# Patient Record
Sex: FEMALE | Race: OTHER | Employment: PART TIME | URBAN - METROPOLITAN AREA
[De-identification: names, ages, dates, MRNs, and addresses within clinical notes are randomized per-mention and may not be internally consistent; named-entity substitution may affect disease eponyms.]

---

## 2017-09-10 ENCOUNTER — HOSPITAL ENCOUNTER (EMERGENCY)
Age: 22
Discharge: HOME | End: 2017-09-10
Attending: EMERGENCY MEDICINE
Payer: SUBSIDIZED

## 2017-09-10 VITALS
DIASTOLIC BLOOD PRESSURE: 76 MMHG | BODY MASS INDEX: 20.16 KG/M2 | TEMPERATURE: 98.5 F | OXYGEN SATURATION: 97 % | HEIGHT: 59 IN | WEIGHT: 100 LBS | HEART RATE: 89 BPM | RESPIRATION RATE: 16 BRPM | SYSTOLIC BLOOD PRESSURE: 122 MMHG

## 2017-09-10 DIAGNOSIS — J06.9 ACUTE UPPER RESPIRATORY INFECTION: Primary | ICD-10-CM

## 2017-09-10 LAB — DEPRECATED S PYO AG THROAT QL EIA: NEGATIVE

## 2017-09-10 PROCEDURE — 99283 EMERGENCY DEPT VISIT LOW MDM: CPT

## 2017-09-10 PROCEDURE — 74011250637 HC RX REV CODE- 250/637: Performed by: EMERGENCY MEDICINE

## 2017-09-10 PROCEDURE — 87070 CULTURE OTHR SPECIMN AEROBIC: CPT | Performed by: EMERGENCY MEDICINE

## 2017-09-10 PROCEDURE — 87880 STREP A ASSAY W/OPTIC: CPT | Performed by: EMERGENCY MEDICINE

## 2017-09-10 RX ORDER — DIPHENHYDRAMINE HCL 12.5MG/5ML
25 ELIXIR ORAL
Status: COMPLETED | OUTPATIENT
Start: 2017-09-10 | End: 2017-09-10

## 2017-09-10 RX ORDER — FLUTICASONE PROPIONATE 50 MCG
2 SPRAY, SUSPENSION (ML) NASAL DAILY
Qty: 1 BOTTLE | Refills: 0 | Status: SHIPPED | OUTPATIENT
Start: 2017-09-10 | End: 2018-03-17

## 2017-09-10 RX ADMIN — DIPHENHYDRAMINE HYDROCHLORIDE 25 MG: 12.5 SOLUTION ORAL at 12:53

## 2017-09-10 NOTE — DISCHARGE INSTRUCTIONS

## 2017-09-12 LAB
BACTERIA SPEC CULT: NORMAL
SERVICE CMNT-IMP: NORMAL

## 2017-12-08 ENCOUNTER — HOSPITAL ENCOUNTER (EMERGENCY)
Age: 22
Discharge: HOME | End: 2017-12-08
Attending: EMERGENCY MEDICINE
Payer: SUBSIDIZED

## 2017-12-08 VITALS
HEART RATE: 76 BPM | OXYGEN SATURATION: 100 % | DIASTOLIC BLOOD PRESSURE: 84 MMHG | BODY MASS INDEX: 20.16 KG/M2 | WEIGHT: 100 LBS | SYSTOLIC BLOOD PRESSURE: 125 MMHG | HEIGHT: 59 IN | RESPIRATION RATE: 18 BRPM | TEMPERATURE: 98.2 F

## 2017-12-08 DIAGNOSIS — E05.00 GRAVES DISEASE: Primary | ICD-10-CM

## 2017-12-08 LAB
AMORPH CRY URNS QL MICRO: ABNORMAL
APPEARANCE UR: ABNORMAL
ATRIAL RATE: 70 BPM
BACTERIA URNS QL MICRO: NEGATIVE /HPF
BILIRUB UR QL: NEGATIVE
CALCULATED P AXIS, ECG09: 45 DEGREES
CALCULATED R AXIS, ECG10: 3 DEGREES
CALCULATED T AXIS, ECG11: 11 DEGREES
COLOR UR: ABNORMAL
DIAGNOSIS, 93000: NORMAL
EPITH CASTS URNS QL MICRO: ABNORMAL /LPF
GLUCOSE UR STRIP.AUTO-MCNC: NEGATIVE MG/DL
HGB UR QL STRIP: NEGATIVE
KETONES UR QL STRIP.AUTO: NEGATIVE MG/DL
LEUKOCYTE ESTERASE UR QL STRIP.AUTO: ABNORMAL
NITRITE UR QL STRIP.AUTO: NEGATIVE
P-R INTERVAL, ECG05: 126 MS
PH UR STRIP: 7.5 [PH] (ref 5–8)
PROT UR STRIP-MCNC: NEGATIVE MG/DL
Q-T INTERVAL, ECG07: 374 MS
QRS DURATION, ECG06: 88 MS
QTC CALCULATION (BEZET), ECG08: 403 MS
RBC #/AREA URNS HPF: ABNORMAL /HPF (ref 0–5)
SP GR UR REFRACTOMETRY: 1.02 (ref 1–1.03)
T4 FREE SERPL-MCNC: 1.6 NG/DL (ref 0.8–1.5)
TSH SERPL DL<=0.05 MIU/L-ACNC: 0.01 UIU/ML (ref 0.36–3.74)
UROBILINOGEN UR QL STRIP.AUTO: 0.2 EU/DL (ref 0.2–1)
VENTRICULAR RATE, ECG03: 70 BPM
WBC URNS QL MICRO: ABNORMAL /HPF (ref 0–4)

## 2017-12-08 PROCEDURE — 84443 ASSAY THYROID STIM HORMONE: CPT | Performed by: FAMILY MEDICINE

## 2017-12-08 PROCEDURE — 84439 ASSAY OF FREE THYROXINE: CPT | Performed by: FAMILY MEDICINE

## 2017-12-08 PROCEDURE — 81001 URINALYSIS AUTO W/SCOPE: CPT | Performed by: FAMILY MEDICINE

## 2017-12-08 PROCEDURE — 99283 EMERGENCY DEPT VISIT LOW MDM: CPT

## 2017-12-08 PROCEDURE — 93005 ELECTROCARDIOGRAM TRACING: CPT

## 2017-12-08 PROCEDURE — 36415 COLL VENOUS BLD VENIPUNCTURE: CPT | Performed by: FAMILY MEDICINE

## 2017-12-08 RX ORDER — PROPRANOLOL HYDROCHLORIDE 10 MG/1
10 TABLET ORAL DAILY
Qty: 10 TAB | Refills: 0 | Status: SHIPPED | OUTPATIENT
Start: 2017-12-08 | End: 2017-12-11 | Stop reason: SDUPTHER

## 2017-12-08 RX ORDER — METHIMAZOLE 10 MG/1
10 TABLET ORAL DAILY
Qty: 30 TAB | Refills: 0 | Status: SHIPPED | OUTPATIENT
Start: 2017-12-08 | End: 2017-12-11 | Stop reason: SDUPTHER

## 2017-12-08 NOTE — ED NOTES
Patient given discharge instruction by Alan Watkins. Verbalized understanding, pt discharge home with family.

## 2017-12-08 NOTE — ED PROVIDER NOTES
HPI Comments: Ms. Mariola Joaquin is a 23year old female with history of Grave's disease who presents with headache. For the past 4 days she has had intermittent bifrontal, pressure like headache associated with photophobia and phonophobia. Headache is not currently present. The headache lasts a couple of hours when it comes and then goes away on its own. Of note, patient has not been taking her methimazole or propranolol because she ran out of her medications and has not established with a PCP. Yesterday she felt like she had some palpitations. She denies chest pain. She also complains of some mood swings and feelings of anxiety. The history is provided by the patient. No past medical history on file. No past surgical history on file. No family history on file. Social History     Social History    Marital status:      Spouse name: N/A    Number of children: N/A    Years of education: N/A     Occupational History    Not on file. Social History Main Topics    Smoking status: Not on file    Smokeless tobacco: Not on file    Alcohol use Not on file    Drug use: Not on file    Sexual activity: Not on file     Other Topics Concern    Not on file     Social History Narrative         ALLERGIES: Review of patient's allergies indicates no known allergies. Review of Systems   Constitutional: Negative for activity change, chills, fatigue and fever. HENT: Negative. Negative for congestion, dental problem, ear pain, mouth sores, sinus pain, sinus pressure and sore throat. Eyes: Negative for photophobia, pain and visual disturbance. Respiratory: Negative for cough, chest tightness and shortness of breath. Cardiovascular: Negative for chest pain and palpitations. Gastrointestinal: Negative for abdominal pain, constipation, diarrhea, nausea and vomiting. Genitourinary: Negative. Negative for difficulty urinating and urgency.    Musculoskeletal: Negative for back pain and neck pain.   Skin: Negative. Neurological: Positive for headaches. Negative for speech difficulty. Psychiatric/Behavioral: Negative. Vitals:    12/08/17 1220   BP: 125/84   Pulse: 76   Resp: 18   Temp: 98.2 °F (36.8 °C)   SpO2: 100%   Weight: 45.4 kg (100 lb)   Height: 4' 11\" (1.499 m)            Physical Exam   Constitutional: She is oriented to person, place, and time. She appears well-developed and well-nourished. No distress. HENT:   Head: Normocephalic and atraumatic. Mouth/Throat: Oropharynx is clear and moist.   Eyes: Conjunctivae are normal.   Neck: Neck supple. Thyromegaly present. Cardiovascular: Normal rate, regular rhythm and normal heart sounds. No murmur heard. Pulmonary/Chest: Effort normal and breath sounds normal. No respiratory distress. She has no wheezes. Abdominal: Soft. Bowel sounds are normal. She exhibits no distension. There is no tenderness. Musculoskeletal: She exhibits no deformity. Neurological: She is alert and oriented to person, place, and time. Skin: Skin is warm and dry. Psychiatric: She has a normal mood and affect. Nursing note and vitals reviewed.        MDM  Number of Diagnoses or Management Options  Diagnosis management comments: 23year old female with uncontrolled Graves disease  - Check TSH and T4  - Discharge home with propranolol    ED Course       Procedures

## 2017-12-08 NOTE — ED TRIAGE NOTES
Pt c/o headache that began yesterday, and also has felt \"irregular heartbeat\". + photophobia Pt denies nausea or vomiting. Reports history of Graves disease.

## 2017-12-08 NOTE — LETTER
[facility] EMERGENCY DEPT 
[address] [address] [id number] [phone] Work/School Note Date: 12/8/2017 To Whom It May concern: 
 
Rebecca Douglas was seen and treated today in the emergency room by the following provider(s): 
Attending Provider: Robi Roper MD 
Resident: Ander Delgado MD. Rebecca Douglas may return to work on 12/12/17. Sincerely, Winnie Romero RN

## 2017-12-11 ENCOUNTER — OFFICE VISIT (OUTPATIENT)
Dept: FAMILY MEDICINE CLINIC | Age: 22
End: 2017-12-11

## 2017-12-11 VITALS
HEIGHT: 59 IN | WEIGHT: 99 LBS | RESPIRATION RATE: 16 BRPM | HEART RATE: 62 BPM | BODY MASS INDEX: 19.96 KG/M2 | TEMPERATURE: 99 F | SYSTOLIC BLOOD PRESSURE: 104 MMHG | DIASTOLIC BLOOD PRESSURE: 68 MMHG

## 2017-12-11 DIAGNOSIS — Z28.21 REFUSED INFLUENZA VACCINE: ICD-10-CM

## 2017-12-11 DIAGNOSIS — N92.6 IRREGULAR PERIODS/MENSTRUAL CYCLES: ICD-10-CM

## 2017-12-11 DIAGNOSIS — Z76.89 ENCOUNTER TO ESTABLISH CARE: ICD-10-CM

## 2017-12-11 DIAGNOSIS — L70.0 ACNE VULGARIS: ICD-10-CM

## 2017-12-11 DIAGNOSIS — E05.00 GRAVES DISEASE: Primary | ICD-10-CM

## 2017-12-11 RX ORDER — NORGESTIMATE AND ETHINYL ESTRADIOL 0.25-0.035
1 KIT ORAL DAILY
Qty: 1 PACKAGE | Refills: 11 | Status: SHIPPED | OUTPATIENT
Start: 2017-12-11 | End: 2018-10-04 | Stop reason: SDUPTHER

## 2017-12-11 RX ORDER — PROPRANOLOL HYDROCHLORIDE 10 MG/1
10 TABLET ORAL DAILY
Qty: 90 TAB | Refills: 2 | Status: SHIPPED | OUTPATIENT
Start: 2017-12-11 | End: 2018-03-17

## 2017-12-11 RX ORDER — METHIMAZOLE 10 MG/1
10 TABLET ORAL DAILY
Qty: 90 TAB | Refills: 2 | Status: SHIPPED | OUTPATIENT
Start: 2017-12-11

## 2017-12-11 NOTE — PROGRESS NOTES
Subjective:   Karis Sanchez is a 23 y.o. female with history of Grave's Disease  CC: Follow up Graves disease  History provided by patient. HPI:  Graves disease: Main issues currently include: Fatigue, migraines, mood swings. Fatigue that occurs about half the days of the week, described as a \"crash\" during the day. Denies syncopal episodes, but has had \"dizzy\" episodes. Migraines starting at top of head to behind eyes last minutes to hours. Exacerbated by light and sound. Mood swings improving with medications. Currently taking Methimazole and propranolol daily. Restarted Methimazole and Propranolol on Friday. Patient Last saw endocrinologist [facility]). Before moving was taking Methimazole every other day. Acne: Worse with stress and not controlled Graves Disease. Improving overall. Irregular Periods:  Patient with periods that are irregular in cycle, not every 30 days. Length of period irregular, on average 4-6 days. Noting significant cramping and bleeding on first day. First period at 16-14 y.o. Social History:  Exercise: No  Diet: ~4-5 servings vegetables/fruits, mixed diet    Current Outpatient Prescriptions on File Prior to Visit   Medication Sig Dispense Refill    fluticasone (FLONASE) 50 mcg/actuation nasal spray 2 Sprays by Both Nostrils route daily. 1 Bottle 0     No current facility-administered medications on file prior to visit.         Patient Active Problem List   Diagnosis Code    Graves disease E05.00     Past Surgical History:   Procedure Laterality Date    HX WISDOM TEETH EXTRACTION       Family History   Problem Relation Age of Onset    No Known Problems Mother     No Known Problems Brother     Diabetes Maternal Grandmother     Hypertension Maternal Grandmother     Cancer Maternal Grandfather     No Known Problems Brother     No Known Problems Brother        Social History     Social History    Marital status: UNKNOWN     Spouse name: N/A    Number of children: N/A    Years of education: N/A     Occupational History    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 Social History Main Topics    Smoking status: Never Smoker    Smokeless tobacco: Never Used    Alcohol use No    Drug use: No    Sexual activity: Not Currently     Partners: Male      Comment: Previously Sexually active,      Other Topics Concern    Not on file     Social History Narrative    No narrative on file       Review of Systems   Constitutional: Positive for malaise/fatigue. Negative for chills and fever. Respiratory: Negative for shortness of breath. Cardiovascular: Negative for chest pain and palpitations. Gastrointestinal: Negative for abdominal pain, nausea and vomiting. Musculoskeletal: Negative for myalgias. Neurological: Positive for headaches. Objective:     Visit Vitals    /68 (BP 1 Location: Right arm, BP Patient Position: Sitting)    Pulse 62    Temp 99 °F (37.2 °C) (Oral)    Resp 16    Ht 4' 11\" (1.499 m)    Wt 99 lb (44.9 kg)    LMP 11/24/2017 (Approximate)    BMI 20 kg/m2        Physical Exam   Constitutional: She is oriented to person, place, and time. She appears well-developed and well-nourished. No distress. HENT:   Head: Normocephalic and atraumatic. Eyes: EOM are normal. Pupils are equal, round, and reactive to light. Neck: Thyromegaly present. No thyroid mass present. Cardiovascular: Normal rate, regular rhythm, normal heart sounds and intact distal pulses. Exam reveals no gallop and no friction rub. No murmur heard. Pulmonary/Chest: Effort normal and breath sounds normal.   Abdominal: Soft. Bowel sounds are normal. She exhibits no distension. There is no tenderness. Musculoskeletal: Normal range of motion. She exhibits no edema. Neurological: She is alert and oriented to person, place, and time. Skin:   Acne, mild non-inflammatory   Psychiatric: She has a normal mood and affect.  Her behavior is normal. Judgment and thought content normal.   Nursing note and vitals reviewed. Pertinent Labs/Studies:      Assessment and orders:       ICD-10-CM ICD-9-CM    1. Graves disease E05.00 242.00 REFERRAL TO ENDOCRINOLOGY      propranolol (INDERAL) 10 mg tablet      methIMAzole (TAPAZOLE) 10 mg tablet   2. Encounter to establish care Z76.89 V65.8    3. Acne vulgaris L70.0 706.1 benzoyl peroxide 10 % topical cream   4. Irregular periods/menstrual cycles N92.6 626.4 norgestimate-ethinyl estradiol (ORTHO-CYCLEN, SPRINTEC) 0.25-35 mg-mcg tab   5. Refused influenza vaccine Z28.21 V64.06      Diagnoses and all orders for this visit:    1. Graves disease/Encounter to establish care: Follow up from ED, improving. Lapse secondary to moving and loss of insurance. Refilling medications and referring to Endocrinology. Repeat TSH/T4 in 6 weeks. -     REFERRAL TO ENDOCRINOLOGY  -     propranolol (INDERAL) 10 mg tablet; Take 1 Tab by mouth daily. -     methIMAzole (TAPAZOLE) 10 mg tablet; Take 1 Tab by mouth daily. 2. Acne vulgaris: Mild case, starting basic topical agent. As patient is interested in becoming sexual active with avoid retinoids. -     benzoyl peroxide 10 % topical cream; Apply  to affected area nightly. 3. Irregular periods/menstrual cycles: Starting Sprintec. Advised on proper use of medication and risks with missing medications. -     norgestimate-ethinyl estradiol (Duy Kanaris) 0.25-35 mg-mcg tab; Take 1 Tab by mouth daily. Follow-up Disposition:  Return in about 6 weeks (around 1/22/2018). I have reviewed patient medical and social history and medications. I have reviewed pertinent labs results and other data. I have discussed the diagnosis with the patient and the intended plan as seen in the above orders. The patient has received an after-visit summary and questions were answered concerning future plans.  I have discussed medication side effects and warnings with the patient as pat.    Jose Manuel Gutierrez MD  Resident [facility]  12/11/17

## 2017-12-11 NOTE — MR AVS SNAPSHOT
Visit Information Date & Time Provider Department Dept. Phone Encounter #  
 12/11/2017  2:05 PM Anibal Howell, [facility] [phone] [id number] Upcoming Health Maintenance Date Due Hepatitis A Peds Age 1-18 (1 of 2 - Standard Series) 5/3/1998 DTaP/Tdap/Td series (1 - Tdap) 5/3/2004 HPV AGE 9Y-26Y (1 of 3 - Female 3 Dose Series) 5/3/2008 Influenza Age 5 to Adult 8/1/2017 Allergies as of 12/11/2017  Review Complete On: 12/8/2017 By: Dalila Capellan RN No Known Allergies Current Immunizations  Never Reviewed No immunizations on file. Not reviewed this visit You Were Diagnosed With   
  
 Codes Comments Graves disease    -  Primary ICD-10-CM: E05.00 ICD-9-CM: 242.00 Encounter to establish care     ICD-10-CM: Z76.89 
ICD-9-CM: V65.8 Acne vulgaris     ICD-10-CM: L70.0 ICD-9-CM: 706.1 Irregular periods/menstrual cycles     ICD-10-CM: N92.6 ICD-9-CM: 626.4 Vitals BP Pulse Temp Resp Height(growth percentile) Weight(growth percentile) 104/68 (BP 1 Location: Right arm, BP Patient Position: Sitting) 62 99 °F (37.2 °C) (Oral) 16 4' 11\" (1.499 m) 99 lb (44.9 kg) LMP BMI OB Status Smoking Status 11/24/2017 (Approximate) 20 kg/m2 Having regular periods Never Smoker BMI and BSA Data Body Mass Index Body Surface Area  
 20 kg/m 2 1.37 m 2 Preferred Pharmacy Pharmacy Name Phone [id number] [address], [address] [phone] Your Updated Medication List  
  
   
This list is accurate as of: 12/11/17  3:35 PM.  Always use your most recent med list.  
  
  
  
  
 benzoyl peroxide 10 % topical cream  
Apply  to affected area nightly. fluticasone 50 mcg/actuation nasal spray Commonly known as:  Ronak Peel 2 Sprays by Both Nostrils route daily. methIMAzole 10 mg tablet Commonly known as:  TAPAZOLE Take 1 Tab by mouth daily. norgestimate-ethinyl estradiol 0.25-35 mg-mcg Tab Commonly known as:  Scott Mary Jane Take 1 Tab by mouth daily. propranolol 10 mg tablet Commonly known as:  INDERAL Take 1 Tab by mouth daily. Prescriptions Printed Refills  
 propranolol (INDERAL) 10 mg tablet 2 Sig: Take 1 Tab by mouth daily. Class: Print Route: Oral  
 methIMAzole (TAPAZOLE) 10 mg tablet 2 Sig: Take 1 Tab by mouth daily. Class: Print Route: Oral  
 norgestimate-ethinyl estradiol (ORTHO-CYCLEN, SPRINTEC) 0.25-35 mg-mcg tab 11 Sig: Take 1 Tab by mouth daily. Class: Print Route: Oral  
 benzoyl peroxide 10 % topical cream 1 Sig: Apply  to affected area nightly. Class: Print Route: Topical  
  
We Performed the Following REFERRAL TO ENDOCRINOLOGY [PXX27 Custom] Referral Information Referral ID Referred By Referred To  
  
 [id number] Aiden Shanna, [address] [place] [address] [place], [facility] Phone: [phone] Fax: [phone] Visits Status Start Date End Date 1 New Request 12/11/17 12/11/18 If your referral has a status of pending review or denied, additional information will be sent to support the outcome of this decision. Introducing [facility]! Jazmin Khan introduces [employer] patient portal. Now you can access parts of your medical record, email your doctor's office, and request medication refills online. 1. In your internet browser, go to [email or web address] 2. Click on the First Time User? Click Here link in the Sign In box. You will see the New Member Sign Up page. 3. Enter your [employer] Access Code exactly as it appears below. You will not need to use this code after youve completed the sign-up process. If you do not sign up before the expiration date, you must request a new code.  
 
· [employer] Access Code: [email or web address] 
 Expires: 3/11/2018  3:35 PM 
 
4. Enter the last four digits of your Social Security Number (xxxx) and Date of Birth (mm/dd/yyyy) as indicated and click Submit. You will be taken to the next sign-up page. 5. Create a [employer] ID. This will be your [employer] login ID and cannot be changed, so think of one that is secure and easy to remember. 6. Create a [employer] password. You can change your password at any time. 7. Enter your Password Reset Question and Answer. This can be used at a later time if you forget your password. 8. Enter your e-mail address. You will receive e-mail notification when new information is available in [address] 9. Click Sign Up. You can now view and download portions of your medical record. 10. Click the Download Summary menu link to download a portable copy of your medical information. If you have questions, please visit the Frequently Asked Questions section of the [employer] website. Remember, [employer] is NOT to be used for urgent needs. For medical emergencies, dial 911. Now available from your iPhone and Android! Please provide this summary of care documentation to your next provider. Your primary care clinician is listed as NONE. If you have any questions after today's visit, please call [phone].

## 2017-12-19 ENCOUNTER — OFFICE VISIT (OUTPATIENT)
Dept: ENDOCRINOLOGY | Age: 22
End: 2017-12-19

## 2017-12-19 VITALS
TEMPERATURE: 98.1 F | HEIGHT: 59 IN | SYSTOLIC BLOOD PRESSURE: 103 MMHG | WEIGHT: 100.3 LBS | HEART RATE: 77 BPM | OXYGEN SATURATION: 100 % | RESPIRATION RATE: 14 BRPM | DIASTOLIC BLOOD PRESSURE: 74 MMHG | BODY MASS INDEX: 20.22 KG/M2

## 2017-12-19 DIAGNOSIS — E05.00 GRAVES DISEASE: Primary | ICD-10-CM

## 2017-12-19 DIAGNOSIS — E04.0 GOITER DIFFUSE: ICD-10-CM

## 2017-12-19 NOTE — PROGRESS NOTES
[facility]               Steve Malik MD              [address] 6[id number]           Patient Information  Date:12/21/2017  Name : Isaac Valencia 23 y.o.     YOB: 1995         Referred by: Jose Manuel Gutierrez MD         Chief Complaint   Patient presents with    New Patient     referred for Thyroid       History of present illness    Isaac Valencia is a 23 y.o. female  here for evaluation of hyperthyroidism. She was diagnosed with Graves' disease while she was in [place] in 2016, was on methimazole, stopped beginning of 2017 as she had no insurance. She  was evaluated by PCP, started on methimazole. No weight loss but complains of tiredness, intermittent nervousness. No change in the size of the neck. + heat intolerance. Diarrhea +     No A fib or osteoporosis  No recent illness . No iodine exposure     No history of known radiation exposure    No FH of thyroid disease. No FH of thyroid cancer     Wt Readings from Last 3 Encounters:   12/19/17 100 lb 4.8 oz (45.5 kg)   12/11/17 99 lb (44.9 kg)   12/08/17 100 lb (45.4 kg)       Past Medical History:   Diagnosis Date    Graves disease     Thyroid disease        Current Outpatient Prescriptions   Medication Sig    propranolol (INDERAL) 10 mg tablet Take 1 Tab by mouth daily.  methIMAzole (TAPAZOLE) 10 mg tablet Take 1 Tab by mouth daily.  fluticasone (FLONASE) 50 mcg/actuation nasal spray 2 Sprays by Both Nostrils route daily. (Patient taking differently: 2 Sprays by Both Nostrils route as needed.)    norgestimate-ethinyl estradiol (ORTHO-CYCLEN, SPRINTEC) 0.25-35 mg-mcg tab Take 1 Tab by mouth daily.  benzoyl peroxide 10 % topical cream Apply  to affected area nightly. No current facility-administered medications for this visit.         No Known Allergies    Review of Systems:  - Constitutional Symptoms: no fevers, chills, weight loss  - Eyes: no blurry vision or double vision  - Cardiovascular: no chest pain +palpitations  - Respiratory: no cough or shortness of breath  - Gastrointestinal: no dysphagia or abdominal pain  - Musculoskeletal: no joint pains or weakness  - Integumentary: no rashes  - Neurological: no numbness, tingling, or headaches  - Psychiatric: no depression or anxiety  - Endocrine: no heat or cold intolerance, no polyuria or polydipsia    Physical Examination:  Blood pressure 103/74, pulse 77, temperature 98.1 °F (36.7 °C), temperature source Oral, resp. rate 14, height 4' 11\" (1.499 m), weight 100 lb 4.8 oz (45.5 kg), last menstrual period 11/24/2017, SpO2 100 %. Body mass index is 20.26 kg/(m^2). - General: pleasant, no distress, good eye contact  - HEENT: no exopthalmos, no periorbital edema, no scleral/conjunctival injection, EOMI, no lid lag or stare, right eye prominent  - Neck: supple,+ thyromegaly, nodules, lymph nodes,   - Cardiovascular: regular,  normal S1 and S2, no murmurs  - Respiratory: clear to auscultation bilaterally  - Gastrointestinal: soft, nontender, nondistended, BS +  - Musculoskeletal: no proximal muscle weakness in upper or lower extremities  - Integumentary: no tremors, no edema  - Neurological: alert and oriented   - Psychiatric: normal mood and affect  - Skin - normal turgor    Data Reviewed:       Lab Results   Component Value Date/Time    TSH 0.01 12/08/2017 01:08 PM    T4, Free 1.6 12/08/2017 01:08 PM        [] Reviewed labs    Assessment/Plan:     1. Graves disease    2. Goiter diffuse        Discussed the treatment options for hyperthyroidism: anti-thyroid drugs, radioiodine ablation and surgery. Surgery is usually for large goiters, who cannot take radioactive iodine or anti-thyroid medications. I explained that she may need to be on a thyroid supplement for the rest of her life once  thyroid becomes underactive, which in most patients, occurs within 2-6 months after radioiodine. Reviewed potential side effects of methimazole including rash,neutropenia ( low blood count)and rarely liver inflammation. Propranolol half tablet twice daily, discussed about the weaning protocol. Methimazole , she will decide about radioactive iodine therapy and let me know. Advised to avoid pregnancy, and if she gets pregnant was asked to notify the office. Follow up labs       Follow-up Disposition:  Return in about 2 months (around 2/19/2018). Thank you for allowing me to participate in the care of this patient.     Jaydon Ashraf MD      Patient verbalized understanding

## 2017-12-19 NOTE — PATIENT INSTRUCTIONS
Potential side effects of methimazole are rash,low blood count and rarely liver inflammation. If you get high grade fever,bad sorethroat,or sores in the mouth ,please call the office for blood tests. If white blood count is low,the methimazole should be stopped and the counts will normalize in 7 to 10 days. Propranolol 1/2 tab twice a day       Radioactive Iodine: What to Expect at Home  Your Recovery  Radioactive iodine is absorbed and concentrated by the thyroid gland. You get it in liquid or pill form. The radiation will pass out of your body through your urine within days. Until that time, you will give off radiation in your sweat, your saliva, your urine, and anything else that comes out of your body. It is important to avoid exposing other people to the radioactivity from your body. Your doctor will give you more written instructions. Follow these carefully. The instructions will tell you how far to stay away from people and how long you need to follow the precautions listed below. They will list other ways to keep other people safe. They will also tell you when it will be safe to go out, go to work, and do other activities. How can you care for yourself at home? General recommendations  · For a period of time, you will need to keep your distance from other people, especially young children and pregnant women. · Avoid close contact, kissing, and sexual activity. You may need to sleep in a separate bed from your partner. · Keep the toilet very clean. Men should urinate sitting down to avoid splashing. Flush the toilet 2 or 3 times after each use. Wash your hands well with soap and lots of water each time you use the toilet. · Rinse the bathroom sink and tub well after you use them. · Use separate towels, washcloths, and sheets. Wash these and your personal clothing by themselves. Don't wash them with other people's laundry.   · You may want to use a special plastic trash bag for all your trash, such as bandages, paper or plastic dishes, menstrual pads, tissues, or paper towels. Talk to your treatment facility to see if they will handle the disposal. Or after 80 days, this bag can be thrown out with your other trash. · Wash your dishes in a  or by hand. If you use disposable dishes, they must be thrown away in the special plastic trash bag. · Don't cook for other people. If you must cook, use plastic gloves. Then throw them away in the special plastic trash bag. Don't share cups, dishes, or utensils. Pregnancy and children  · Your doctor will tell you when it is safe to have sex and become pregnant. · You should not breastfeed your baby after you have been treated with radioactive iodine. Ask your doctor when it's safe to breastfeed. Travel  · Don't take public transportation. If you are able, it's best to drive yourself. · It is important to prepare for any problems you may have at airport security. People who have had radioactive iodine treatment can set off the radiation detection machines in airports for a week to 10 days. Check with local authorities about any steps or permission you may need to travel. · If you plan to travel on the interstate, you may set off radiation detectors. Most police and transportation workers are aware of medical radiation, but it may help to carry some paperwork from your doctor. Follow-up care is a key part of your treatment and safety. Be sure to make and go to all appointments, and call your doctor if you are having problems. It's also a good idea to know your test results and keep a list of the medicines you take. When should you call for help? Watch closely for any changes in your health, and be sure to contact your doctor if:  ? · You have a sore throat. ? · You vomit. ? · You have diarrhea. Where can you learn more? Go to http://valerie-charissa.info/.   Enter N018 in the search box to learn more about \"Radioactive Iodine: What to Expect at Home. \"  Current as of: May 12, 2017  Content Version: 11.4  © 0388-4014 "[employer]. Care instructions adapted under license by [employer] (which disclaims liability or warranty for this information). If you have questions about a medical condition or this instruction, always ask your healthcare professional. [address] any warranty or liability for your use of this information. Hyperthyroidism: Care Instructions  Your Care Instructions  Hyperthyroidism occurs when the thyroid gland makes too much thyroid hormone. This speeds up your metabolism-how your body uses energy. This condition can cause you to be very active, lose weight, and have sleep problems, eye problems, and a fast heart rate. It can also cause a goiter. A goiter is an enlarged thyroid gland that you can see at the front of the neck. Hyperthyroidism is often caused by Graves' disease. In Graves' disease, the body's defense (immune) system attacks the thyroid gland. Your doctor may prescribe a beta-blocker medicine to slow your pulse and calm you down. But this is not a treatment for hyperthyroidism. It is given for your fast heart rate. Your doctor may also give you antithyroid medicine. This medicine keeps excess thyroid hormone in check. In some cases, doctors recommend radioactive iodine or surgery to remove the thyroid. After either of these treatments, you may need to take medicine to replace thyroid hormone for the rest of your life. Follow-up care is a key part of your treatment and safety. Be sure to make and go to all appointments, and call your doctor if you are having problems. It's also a good idea to know your test results and keep a list of the medicines you take. How can you care for yourself at home? · Take your medicines exactly as prescribed. You need to take the thyroid medicine at the same time each day.  Call your doctor if you think you are having a problem with your medicine. · Graves' disease can make your eyes sore. Use artificial tears, eye drops, and sunglasses to protect your eyes from dryness, wind, and sun. Raise your head with pillows at night to prevent your eyes from swelling. In some cases, taping your eyelids shut at night will keep your eyes from being dry in the morning. · Make sure you get enough calcium. Foods that are rich in calcium include milk, yogurt, cheese, and dark green vegetables. · If you need to gain weight, ask your doctor about special diets. · Do not eat kelp. Chris Carson is high in iodine, which can make hyperthyroidism worse. Chris Carson is commonly used in [employer] and other [place] foods. You can use iodized salt and eat bread and seafood. Try to eat a balanced diet. · Do not use caffeine and other stimulants. These can make symptoms worse, such as a fast heartbeat, nervousness, and problems focusing. · Do not smoke. Smoking can make your condition worse and may lead to more serious eye problems. If you need help quitting, talk to your doctor about stop-smoking programs and medicines. These can increase your chances of quitting for good. · Lower your stress. Learn to use biofeedback, guided imagery, meditation, or other methods to relax. · Use creams or ointments for irritated skin. Ask your doctor which type to use. · Tell all your doctors about your condition. They need to know because some medicines contain iodine. When should you call for help? Call your doctor now or seek immediate medical care if:  ? · You have symptoms of a sudden, very high thyroid level (thyroid storm). These include:  ¨ Being nauseated, vomiting, and having diarrhea. ¨ Sweating a lot. ¨ Feeling extremely restless and confused. ¨ Having a high fever. ¨ Having a fast heartbeat. ? · You have sudden vision changes or eye pain. ? · You have a fever or severe sore throat and are taking antithyroid medicines, such as PTU or methimazole. ? Watch closely for changes in your health, and be sure to contact your doctor if:  ? · You have a sore throat or have problems swallowing. ? · You have swollen, itchy, or red eyes or your other eye symptoms get worse, or you have new vision problems. ? · You have signs of a low thyroid level (hypothyroidism). You may feel very tired, confused, or weak. Where can you learn more? Go to http://valerie-charissa.info/. Enter B031 in the search box to learn more about \"Hyperthyroidism: Care Instructions. \"  Current as of: May 12, 2017  Content Version: 11.4  © 6557-8029 [employer]. Care instructions adapted under license by [employer] by [employer] (which disclaims liability or warranty for this information). If you have questions about a medical condition or this instruction, always ask your healthcare professional. [address] any warranty or liability for your use of this information.

## 2017-12-19 NOTE — PROGRESS NOTES
Bobby Kovacs is a 23 y.o. female here for   Chief Complaint   Patient presents with    New Patient     referred for Thyroid       1. Have you been to the ER, urgent care clinic since your last visit? Hospitalized since your last visit? -n/a    2. Have you seen or consulted any other health care providers outside of the [address] [place] [address] since your last visit?   Include any pap smears or colon screening.-n/a    Wt Readings from Last 3 Encounters:   12/11/17 99 lb (44.9 kg)   12/08/17 100 lb (45.4 kg)   09/10/17 100 lb (45.4 kg)     Temp Readings from Last 3 Encounters:   12/11/17 99 °F (37.2 °C) (Oral)   12/08/17 98.2 °F (36.8 °C)   09/10/17 98.5 °F (36.9 °C)     BP Readings from Last 3 Encounters:   12/11/17 104/68   12/08/17 125/84   09/10/17 122/76     Pulse Readings from Last 3 Encounters:   12/11/17 62   12/08/17 76   09/10/17 89     Order placed for pt per verbal order with read back from Dr. Patricia Miranda 12/19/17

## 2017-12-19 NOTE — MR AVS SNAPSHOT
Visit Information Date & Time Provider Department Dept. Phone Encounter #  
 12/19/2017  4:00 PM Sugar Alba MD [facility] Diabetes [facility] Endocrinology [phone] [id number] Follow-up Instructions Return in about 2 months (around 2/19/2018). Upcoming Health Maintenance Date Due Hepatitis A Peds Age 1-18 (1 of 2 - Standard Series) 5/3/1998 DTaP/Tdap/Td series (1 - Tdap) 5/3/2004 HPV AGE 9Y-26Y (1 of 3 - Female 3 Dose Series) 5/3/2008 Influenza Age 5 to Adult 8/1/2017 Allergies as of 12/19/2017  Review Complete On: 12/19/2017 By: Sugar Alba MD  
 No Known Allergies Current Immunizations  Never Reviewed No immunizations on file. Not reviewed this visit You Were Diagnosed With   
  
 Codes Comments Graves disease    -  Primary ICD-10-CM: E05.00 ICD-9-CM: 242.00 Goiter diffuse     ICD-10-CM: E04.9 ICD-9-CM: 240.9 Vitals BP Pulse Temp Resp Height(growth percentile) Weight(growth percentile) 103/74 (BP 1 Location: Left arm, BP Patient Position: Sitting) 77 98.1 °F (36.7 °C) (Oral) 14 4' 11\" (1.499 m) 100 lb 4.8 oz (45.5 kg) LMP SpO2 BMI OB Status Smoking Status 11/24/2017 (Approximate) 100% 20.26 kg/m2 Having regular periods Never Smoker Vitals History BMI and BSA Data Body Mass Index Body Surface Area  
 20.26 kg/m 2 1.38 m 2 Preferred Pharmacy Pharmacy Name Phone [phone] [phone] [facility], [address] [phone] Your Updated Medication List  
  
   
This list is accurate as of: 12/19/17  5:09 PM.  Always use your most recent med list.  
  
  
  
  
 benzoyl peroxide 10 % topical cream  
Apply  to affected area nightly. fluticasone 50 mcg/actuation nasal spray Commonly known as:  Katia Serene 2 Sprays by Both Nostrils route daily. methIMAzole 10 mg tablet Commonly known as:  TAPAZOLE Take 1 Tab by mouth daily. norgestimate-ethinyl estradiol 0.25-35 mg-mcg Tab Commonly known as:  Clare Newkirk Take 1 Tab by mouth daily. propranolol 10 mg tablet Commonly known as:  INDERAL Take 1 Tab by mouth daily. Follow-up Instructions Return in about 2 months (around 2/19/2018). Patient Instructions Potential side effects of methimazole are rash,low blood count and rarely liver inflammation. If you get high grade fever,bad sorethroat,or sores in the mouth ,please call the office for blood tests. If white blood count is low,the methimazole should be stopped and the counts will normalize in 7 to 10 days. Propranolol 1/2 tab twice a day Radioactive Iodine: What to Expect at [facility] Your Recovery Radioactive iodine is absorbed and concentrated by the thyroid gland. You get it in liquid or pill form. The radiation will pass out of your body through your urine within days. Until that time, you will give off radiation in your sweat, your saliva, your urine, and anything else that comes out of your body. It is important to avoid exposing other people to the radioactivity from your body. Your doctor will give you more written instructions. Follow these carefully. The instructions will tell you how far to stay away from people and how long you need to follow the precautions listed below. They will list other ways to keep other people safe. They will also tell you when it will be safe to go out, go to work, and do other activities. How can you care for yourself at home? General recommendations · For a period of time, you will need to keep your distance from other people, especially young children and pregnant women. · Avoid close contact, kissing, and sexual activity. You may need to sleep in a separate bed from your partner. · Keep the toilet very clean. Men should urinate sitting down to avoid splashing. Flush the toilet 2 or 3 times after each use.  Wash your hands well with soap and lots of water each time you use the toilet. · Rinse the bathroom sink and tub well after you use them. · Use separate towels, washcloths, and sheets. Wash these and your personal clothing by themselves. Don't wash them with other people's laundry. · You may want to use a special plastic trash bag for all your trash, such as bandages, paper or plastic dishes, menstrual pads, tissues, or paper towels. Talk to your treatment facility to see if they will handle the disposal. Or after 80 days, this bag can be thrown out with your other trash. · Wash your dishes in a  or by hand. If you use disposable dishes, they must be thrown away in the special plastic trash bag. · Don't cook for other people. If you must cook, use plastic gloves. Then throw them away in the special plastic trash bag. Don't share cups, dishes, or utensils. Pregnancy and children · Your doctor will tell you when it is safe to have sex and become pregnant. · You should not breastfeed your baby after you have been treated with radioactive iodine. Ask your doctor when it's safe to breastfeed. Travel · Don't take public transportation. If you are able, it's best to drive yourself. · It is important to prepare for any problems you may have at airport security. People who have had radioactive iodine treatment can set off the radiation detection machines in airports for a week to 10 days. Check with local authorities about any steps or permission you may need to travel. · If you plan to travel on the interstate, you may set off radiation detectors. Most police and transportation workers are aware of medical radiation, but it may help to carry some paperwork from your doctor. Follow-up care is a key part of your treatment and safety. Be sure to make and go to all appointments, and call your doctor if you are having problems. It's also a good idea to know your test results and keep a list of the medicines you take. When should you call for help? Watch closely for any changes in your health, and be sure to contact your doctor if: 
? · You have a sore throat. ? · You vomit. ? · You have diarrhea. Where can you learn more? Go to http://valerie-charissa.info/. Enter H643 in the search box to learn more about \"Radioactive Iodine: What to Expect at Home. \" Current as of: May 12, 2017 Content Version: 11.4 © 0173-5390 [employer]. Care instructions adapted under license by [employer] (which disclaims liability or warranty for this information). If you have questions about a medical condition or this instruction, always ask your healthcare professional. [place] [address] any warranty or liability for your use of this information. Hyperthyroidism: Care Instructions Your Care Instructions Hyperthyroidism occurs when the thyroid gland makes too much thyroid hormone. This speeds up your metabolism-how your body uses energy. This condition can cause you to be very active, lose weight, and have sleep problems, eye problems, and a fast heart rate. It can also cause a goiter. A goiter is an enlarged thyroid gland that you can see at the front of the neck. Hyperthyroidism is often caused by Graves' disease. In Graves' disease, the body's defense (immune) system attacks the thyroid gland. Your doctor may prescribe a beta-blocker medicine to slow your pulse and calm you down. But this is not a treatment for hyperthyroidism. It is given for your fast heart rate. Your doctor may also give you antithyroid medicine. This medicine keeps excess thyroid hormone in check. In some cases, doctors recommend radioactive iodine or surgery to remove the thyroid. After either of these treatments, you may need to take medicine to replace thyroid hormone for the rest of your life. Follow-up care is a key part of your treatment and safety.  Be sure to make and go to all appointments, and call your doctor if you are having problems. It's also a good idea to know your test results and keep a list of the medicines you take. How can you care for yourself at home? · Take your medicines exactly as prescribed. You need to take the thyroid medicine at the same time each day. Call your doctor if you think you are having a problem with your medicine. · Graves' disease can make your eyes sore. Use artificial tears, eye drops, and sunglasses to protect your eyes from dryness, wind, and sun. Raise your head with pillows at night to prevent your eyes from swelling. In some cases, taping your eyelids shut at night will keep your eyes from being dry in the morning. · Make sure you get enough calcium. Foods that are rich in calcium include milk, yogurt, cheese, and dark green vegetables. · If you need to gain weight, ask your doctor about special diets. · Do not eat kelp. Kristan Flair is high in iodine, which can make hyperthyroidism worse. Kristan Flair is commonly used in [employer] and other needmade foods. You can use iodized salt and eat bread and seafood. Try to eat a balanced diet. · Do not use caffeine and other stimulants. These can make symptoms worse, such as a fast heartbeat, nervousness, and problems focusing. · Do not smoke. Smoking can make your condition worse and may lead to more serious eye problems. If you need help quitting, talk to your doctor about stop-smoking programs and medicines. These can increase your chances of quitting for good. · Lower your stress. Learn to use biofeedback, guided imagery, meditation, or other methods to relax. · Use creams or ointments for irritated skin. Ask your doctor which type to use. · Tell all your doctors about your condition. They need to know because some medicines contain iodine. When should you call for help?  
Call your doctor now or seek immediate medical care if: 
? · You have symptoms of a sudden, very high thyroid level (thyroid storm). These include: ¨ Being nauseated, vomiting, and having diarrhea. ¨ Sweating a lot. ¨ Feeling extremely restless and confused. ¨ Having a high fever. ¨ Having a fast heartbeat. ? · You have sudden vision changes or eye pain. ? · You have a fever or severe sore throat and are taking antithyroid medicines, such as PTU or methimazole. ? Watch closely for changes in your health, and be sure to contact your doctor if: 
? · You have a sore throat or have problems swallowing. ? · You have swollen, itchy, or red eyes or your other eye symptoms get worse, or you have new vision problems. ? · You have signs of a low thyroid level (hypothyroidism). You may feel very tired, confused, or weak. Where can you learn more? Go to http://valerie-charissa.info/. Enter G140 in the search box to learn more about \"Hyperthyroidism: Care Instructions. \" Current as of: May 12, 2017 Content Version: 11.4 © 0018-0614 [employer]. Care instructions adapted under license by [employer] (which disclaims liability or warranty for this information). If you have questions about a medical condition or this instruction, always ask your healthcare professional. [place] [address] any warranty or liability for your use of this information. Introducing [facility]! Chin Gonzalez introduces Intervention Insights patient portal. Now you can access parts of your medical record, email your doctor's office, and request medication refills online. 1. In your internet browser, go to [email or web address] 2. Click on the First Time User? Click Here link in the Sign In box. You will see the New Member Sign Up page. 3. Enter your Intervention Insights Access Code exactly as it appears below. You will not need to use this code after youve completed the sign-up process. If you do not sign up before the expiration date, you must request a new code. · ReCellular Access Code: [email or web address] Expires: 3/11/2018  3:35 PM 
 
4. Enter the last four digits of your Social Security Number (xxxx) and Date of Birth (mm/dd/yyyy) as indicated and click Submit. You will be taken to the next sign-up page. 5. Create a ReCellular ID. This will be your ReCellular login ID and cannot be changed, so think of one that is secure and easy to remember. 6. Create a ReCellular password. You can change your password at any time. 7. Enter your Password Reset Question and Answer. This can be used at a later time if you forget your password. 8. Enter your e-mail address. You will receive e-mail notification when new information is available in [address] 9. Click Sign Up. You can now view and download portions of your medical record. 10. Click the Download Summary menu link to download a portable copy of your medical information. If you have questions, please visit the Frequently Asked Questions section of the ReCellular website. Remember, ReCellular is NOT to be used for urgent needs. For medical emergencies, dial 911. Now available from your iPhone and Android! Please provide this summary of care documentation to your next provider. Your primary care clinician is listed as Concetta Dhillon. If you have any questions after today's visit, please call [phone].

## 2018-01-06 ENCOUNTER — HOSPITAL ENCOUNTER (EMERGENCY)
Age: 23
Discharge: HOME | End: 2018-01-06
Attending: EMERGENCY MEDICINE
Payer: SUBSIDIZED

## 2018-01-06 VITALS
DIASTOLIC BLOOD PRESSURE: 58 MMHG | OXYGEN SATURATION: 99 % | SYSTOLIC BLOOD PRESSURE: 100 MMHG | RESPIRATION RATE: 18 BRPM | WEIGHT: 100 LBS | BODY MASS INDEX: 20.16 KG/M2 | HEART RATE: 86 BPM | TEMPERATURE: 98.4 F | HEIGHT: 59 IN

## 2018-01-06 DIAGNOSIS — J02.0 STREP THROAT: Primary | ICD-10-CM

## 2018-01-06 LAB — DEPRECATED S PYO AG THROAT QL EIA: POSITIVE

## 2018-01-06 PROCEDURE — 99282 EMERGENCY DEPT VISIT SF MDM: CPT

## 2018-01-06 PROCEDURE — 87880 STREP A ASSAY W/OPTIC: CPT | Performed by: EMERGENCY MEDICINE

## 2018-01-06 RX ORDER — PENICILLIN V POTASSIUM 500 MG/1
500 TABLET, FILM COATED ORAL 3 TIMES DAILY
Qty: 30 TAB | Refills: 0 | Status: SHIPPED | OUTPATIENT
Start: 2018-01-06 | End: 2018-01-16

## 2018-01-06 RX ORDER — DEXAMETHASONE SODIUM PHOSPHATE 4 MG/ML
10 INJECTION, SOLUTION INTRA-ARTICULAR; INTRALESIONAL; INTRAMUSCULAR; INTRAVENOUS; SOFT TISSUE
Status: DISCONTINUED | OUTPATIENT
Start: 2018-01-06 | End: 2018-01-06

## 2018-01-06 NOTE — ED PROVIDER NOTES
HPI Comments: 23 y.o. female with past medical history significant for thyroid disease and Grave's disease who presents from home with chief complaint of sore throat. Patient states onset of a sore throat since yesterday that has progressively worsened. Patient admits she has had some trouble swallowing since yesterday with intermittent chills and diaphoresis. Patient also complains of an accompanying headache. Patient mentions she initially had some constipation yesterday; however, this turned into soft stools throughout the day. Patient reports she took Tylenol 3 times yesterday with little relief from her sx. Patient mentions hx of Grave's disease, which she suspects could be a contributing factor. Patient denies any known sick contacts though she works at [employer]. Patient denies any other sx including cough, abdominal pain, nausea, vomiting, diarrhea, and constipation. There are no other acute medical concerns at this time. Old Chart Review: Patient recently established care with an endocrinologist on 12/19/17. Social hx: Patient works at a [employer]  Tobacco Use: No, Alcohol Use: NO, Drug Use: No    PCP: Denise Lanza MD  Endocrinologist: Patrick Downing MD     Note written by Aimee Hurtado, as dictated by Rosalba Tate. Sandee Lara MD 11:06 AM      The history is provided by the patient.         Past Medical History:   Diagnosis Date    Graves disease     Thyroid disease        Past Surgical History:   Procedure Laterality Date    HX WISDOM TEETH EXTRACTION           Family History:   Problem Relation Age of Onset    No Known Problems Mother     No Known Problems Brother     Diabetes Maternal Grandmother     Hypertension Maternal Grandmother     Cancer Maternal Grandfather     No Known Problems Brother     No Known Problems Brother        Social History     Social History    Marital status: UNKNOWN     Spouse name: N/A    Number of children: N/A    Years of education: N/A Occupational History    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 Social History Main Topics    Smoking status: Never Smoker    Smokeless tobacco: Never Used    Alcohol use No    Drug use: No    Sexual activity: Not Currently     Partners: Male      Comment: Previously Sexually active,      Other Topics Concern    Not on file     Social History Narrative         ALLERGIES: Review of patient's allergies indicates no known allergies. Review of Systems   Constitutional: Positive for chills and fever. HENT: Positive for sore throat and trouble swallowing. Negative for ear pain. Eyes: Negative for pain. Respiratory: Negative for cough, chest tightness and shortness of breath. Cardiovascular: Negative for chest pain and leg swelling. Gastrointestinal: Negative for abdominal pain, constipation, diarrhea, nausea and vomiting. Genitourinary: Negative for dysuria and flank pain. Musculoskeletal: Negative for back pain. Skin: Negative for rash. Neurological: Positive for headaches. All other systems reviewed and are negative. Vitals:    01/06/18 1059   BP: 100/58   Pulse: 86   Resp: 18   Temp: 98.4 °F (36.9 °C)   SpO2: 99%   Weight: 45.4 kg (100 lb)   Height: 4' 11\" (1.499 m)            Physical Exam   Constitutional: She appears well-developed and well-nourished. HENT:   Head: Normocephalic and atraumatic. Mouth/Throat: Oropharyngeal exudate present. Swollen tonsils  Tonsillar exudate    Eyes: Conjunctivae and EOM are normal. Pupils are equal, round, and reactive to light. No scleral icterus. Neck: Neck supple. No tracheal deviation present. Cardiovascular: Normal rate, regular rhythm, normal heart sounds and intact distal pulses. Pulmonary/Chest: Effort normal and breath sounds normal. No respiratory distress. Abdominal: Soft. She exhibits no distension. There is no tenderness. There is no rebound and no guarding.    Genitourinary:   Genitourinary Comments: deferred Musculoskeletal: She exhibits no edema. Neurological: She is alert. Skin: Skin is warm and dry. Psychiatric: She has a normal mood and affect. Nursing note and vitals reviewed. Note written by Aimee Reddy, as dictated by Lowell Johnson. Clyde Ambrocio MD 11:06 AM    MDM  Number of Diagnoses or Management Options  Strep throat:   Diagnosis management comments: 24 yo female presents with s/sx of strep throat    ED Course       Procedures    11:46 AM  The patient has been reevaluated. The patient is ready for discharge. The patient's signs, symptoms, diagnosis, and discharge instructions have been discussed and the patient/ family has conveyed their understanding. The patient is to follow up as recommended or return to the ED should their symptoms worsen. Plan has been discussed and the patient is in agreement. LABORATORY TESTS:  Recent Results (from the past 12 hour(s))   STREP AG SCREEN, GROUP A    Collection Time: 01/06/18 11:08 AM   Result Value Ref Range    Group A Strep Ag ID POSITIVE (A) NEG         IMAGING RESULTS:  No orders to display     No results found. MEDICATIONS GIVEN:  Medications   dexamethasone (DECADRON) 4 mg/mL injection 10 mg (not administered)       IMPRESSION:  1. Strep throat        PLAN:  1. Current Discharge Medication List      START taking these medications    Details   penicillin v potassium (VEETID) 500 mg tablet Take 1 Tab by mouth three (3) times daily for 10 days. Qty: 30 Tab, Refills: 0           2. Follow-up Information     Follow up With Details Comments Contact Info    Isaiah Carroll MD Go in 1 week  [address] [address] [facility]  [phone]      [facility] EM[facility]Y [facility]  If symptoms worsen [address] [facility]  [phone]            Return to ED for new or worsening symptoms       Lowell Johnson.  Clyde Ambrocio MD

## 2018-01-06 NOTE — DISCHARGE INSTRUCTIONS
Strep Throat: Care Instructions  Your Care Instructions    Strep throat is a bacterial infection that causes sudden, severe sore throat and fever. Strep throat, which is caused by bacteria called streptococcus, is treated with antibiotics. Sometimes a strep test is necessary to tell if the sore throat is caused by strep bacteria. Treatment can help ease symptoms and may prevent future problems. Follow-up care is a key part of your treatment and safety. Be sure to make and go to all appointments, and call your doctor if you are having problems. It's also a good idea to know your test results and keep a list of the medicines you take. How can you care for yourself at home? · Take your antibiotics as directed. Do not stop taking them just because you feel better. You need to take the full course of antibiotics. · Strep throat can spread to others until 24 hours after you begin taking antibiotics. During this time, you should avoid contact with other people at work or home, especially infants and children. Do not sneeze or cough on others, and wash your hands often. Keep your drinking glass and eating utensils separate from those of others, and wash these items well in hot, soapy water. · Gargle with warm salt water at least once each hour to help reduce swelling and make your throat feel better. Use 1 teaspoon of salt mixed in 8 fluid ounces of warm water. · Take an over-the-counter pain medication, such as acetaminophen (Tylenol), ibuprofen (Advil, Motrin), or naproxen (Aleve). Read and follow all instructions on the label. · Try an over-the-counter anesthetic throat spray or throat lozenges, which may help relieve throat pain. · Drink plenty of fluids. Fluids may help soothe an irritated throat. Hot fluids, such as tea or soup, may help your throat feel better. · Eat soft solids and drink plenty of clear liquids.  Flavored ice pops, ice cream, scrambled eggs, sherbet, and gelatin dessert (such as Jell-O) may also soothe the throat. · Get lots of rest.  · Do not smoke, and avoid secondhand smoke. If you need help quitting, talk to your doctor about stop-smoking programs and medicines. These can increase your chances of quitting for good. · Use a vaporizer or humidifier to add moisture to the air in your bedroom. Follow the directions for cleaning the machine. When should you call for help? Call your doctor now or seek immediate medical care if:  ? · You have a new or higher fever. ? · You have a fever with a stiff neck or severe headache. ? · You have new or worse trouble swallowing. ? · Your sore throat gets much worse on one side. ? · Your pain becomes much worse on one side of your throat. ? Watch closely for changes in your health, and be sure to contact your doctor if:  ? · You are not getting better after 2 days (48 hours). ? · You do not get better as expected. Where can you learn more? Go to http://valerie-charissa.info/. Enter K625 in the search box to learn more about \"Strep Throat: Care Instructions. \"  Current as of: May 12, 2017  Content Version: 11.4  © 2398-6000 [employer], [employer]. Care instructions adapted under license by [employer] (which disclaims liability or warranty for this information). If you have questions about a medical condition or this instruction, always ask your healthcare professional. [address] any warranty or liability for your use of this information.

## 2018-03-17 ENCOUNTER — HOSPITAL ENCOUNTER (EMERGENCY)
Age: 23
Discharge: HOME | End: 2018-03-17
Attending: EMERGENCY MEDICINE
Payer: SUBSIDIZED

## 2018-03-17 VITALS
OXYGEN SATURATION: 100 % | DIASTOLIC BLOOD PRESSURE: 63 MMHG | BODY MASS INDEX: 21.57 KG/M2 | SYSTOLIC BLOOD PRESSURE: 103 MMHG | WEIGHT: 100 LBS | HEIGHT: 57 IN | RESPIRATION RATE: 16 BRPM | TEMPERATURE: 98.2 F | HEART RATE: 59 BPM

## 2018-03-17 DIAGNOSIS — E03.2 HYPOTHYROIDISM DUE TO MEDICATION: Primary | ICD-10-CM

## 2018-03-17 LAB
ANION GAP SERPL CALC-SCNC: 3 MMOL/L (ref 5–15)
APPEARANCE UR: ABNORMAL
BASOPHILS # BLD: 0 K/UL (ref 0–0.1)
BASOPHILS NFR BLD: 0 % (ref 0–1)
BILIRUB UR QL: NEGATIVE
BUN SERPL-MCNC: 16 MG/DL (ref 6–20)
BUN/CREAT SERPL: 22 (ref 12–20)
CALCIUM SERPL-MCNC: 8.3 MG/DL (ref 8.5–10.1)
CHLORIDE SERPL-SCNC: 107 MMOL/L (ref 97–108)
CO2 SERPL-SCNC: 30 MMOL/L (ref 21–32)
COLOR UR: ABNORMAL
CREAT SERPL-MCNC: 0.74 MG/DL (ref 0.55–1.02)
DIFFERENTIAL METHOD BLD: NORMAL
EOSINOPHIL # BLD: 0.1 K/UL (ref 0–0.4)
EOSINOPHIL NFR BLD: 2 % (ref 0–7)
ERYTHROCYTE [DISTWIDTH] IN BLOOD BY AUTOMATED COUNT: 12.5 % (ref 11.5–14.5)
GLUCOSE SERPL-MCNC: 97 MG/DL (ref 65–100)
GLUCOSE UR STRIP.AUTO-MCNC: NEGATIVE MG/DL
HCG UR QL: NEGATIVE
HCT VFR BLD AUTO: 36.8 % (ref 35–47)
HGB BLD-MCNC: 11.9 G/DL (ref 11.5–16)
HGB UR QL STRIP: NEGATIVE
IMM GRANULOCYTES # BLD: 0 K/UL (ref 0–0.04)
IMM GRANULOCYTES NFR BLD AUTO: 0 % (ref 0–0.5)
KETONES UR QL STRIP.AUTO: NEGATIVE MG/DL
LEUKOCYTE ESTERASE UR QL STRIP.AUTO: NEGATIVE
LYMPHOCYTES # BLD: 1.5 K/UL (ref 0.8–3.5)
LYMPHOCYTES NFR BLD: 27 % (ref 12–49)
MCH RBC QN AUTO: 28.7 PG (ref 26–34)
MCHC RBC AUTO-ENTMCNC: 32.3 G/DL (ref 30–36.5)
MCV RBC AUTO: 88.7 FL (ref 80–99)
MONOCYTES # BLD: 0.6 K/UL (ref 0–1)
MONOCYTES NFR BLD: 10 % (ref 5–13)
NEUTS SEG # BLD: 3.4 K/UL (ref 1.8–8)
NEUTS SEG NFR BLD: 61 % (ref 32–75)
NITRITE UR QL STRIP.AUTO: NEGATIVE
NRBC # BLD: 0 K/UL (ref 0–0.01)
NRBC BLD-RTO: 0 PER 100 WBC
PH UR STRIP: 6.5 [PH] (ref 5–8)
PLATELET # BLD AUTO: 284 K/UL (ref 150–400)
PMV BLD AUTO: 10.1 FL (ref 8.9–12.9)
POTASSIUM SERPL-SCNC: 4 MMOL/L (ref 3.5–5.1)
PROT UR STRIP-MCNC: NEGATIVE MG/DL
RBC # BLD AUTO: 4.15 M/UL (ref 3.8–5.2)
SODIUM SERPL-SCNC: 140 MMOL/L (ref 136–145)
SP GR UR REFRACTOMETRY: 1.02 (ref 1–1.03)
TSH SERPL DL<=0.05 MIU/L-ACNC: 7.42 UIU/ML (ref 0.36–3.74)
UROBILINOGEN UR QL STRIP.AUTO: 0.2 EU/DL (ref 0.2–1)
WBC # BLD AUTO: 5.7 K/UL (ref 3.6–11)

## 2018-03-17 PROCEDURE — 99284 EMERGENCY DEPT VISIT MOD MDM: CPT

## 2018-03-17 PROCEDURE — 84443 ASSAY THYROID STIM HORMONE: CPT | Performed by: PHYSICIAN ASSISTANT

## 2018-03-17 PROCEDURE — 85025 COMPLETE CBC W/AUTO DIFF WBC: CPT | Performed by: PHYSICIAN ASSISTANT

## 2018-03-17 PROCEDURE — 36415 COLL VENOUS BLD VENIPUNCTURE: CPT | Performed by: PHYSICIAN ASSISTANT

## 2018-03-17 PROCEDURE — 81025 URINE PREGNANCY TEST: CPT

## 2018-03-17 PROCEDURE — 80048 BASIC METABOLIC PNL TOTAL CA: CPT | Performed by: PHYSICIAN ASSISTANT

## 2018-03-17 PROCEDURE — 81003 URINALYSIS AUTO W/O SCOPE: CPT | Performed by: PHYSICIAN ASSISTANT

## 2018-03-17 NOTE — ED TRIAGE NOTES
Patient was diagnosed with Graves Disease 2 years ago and presents today with report of headache x 3 days. \"Feels like my thyroid levels are off. \" Denies racing heart and shortness of breath.

## 2018-03-17 NOTE — ED PROVIDER NOTES
HPI Comments: Balbina Jaimes is a 23 y.o. female with hx significant for Grave's Disease who presents ambulatory with mother to ER with c/o intermittent headaches and generalized fatigue x one week. Notes she has been having headaches intermittently for the past week. Notes last had headache last evening. States when headache comes on it is always on the left side and notes associated photophobia and phonophobia. Denies hx of migraines. Denies any current headache at this time. Notes when headache occurs, resolves on it's own within a few hours. Pt notes associated generalized fatigue and feeling more tired than normal this past week. Notes dx with grave's disease two years ago that has been controlled with medicine (methimazole, propranolol) since that time except during brief period of time where she did not have insurance and couldn't afford the medicine a few months ago. Has been back on her medicine for the past 2-3 months and last saw endocrinologist one month ago and levels were controlled. Denies missing any doses recently. Denies any new medicines. No visual changes, weakness, numbness, tingling, and any other complaints. Pt denies any current sx at this time. She specifically denies any fevers, chills, nausea, vomiting, chest pain, shortness of breath, rash, diarrhea, abdominal pain, urinary/bowel changes, sweating or weight loss. PCP: Kala Corral MD   PMHx significant for: Past Medical History:  No date: Graves disease  No date: Thyroid disease    PSHx significant for: Past Surgical History:  No date: HX WISDOM TEETH EXTRACTION     No Known Allergies    There are no other complaints, changes or physical findings at this time. The history is provided by the patient.         Past Medical History:   Diagnosis Date    Graves disease     Thyroid disease        Past Surgical History:   Procedure Laterality Date    HX WISDOM TEETH EXTRACTION           Family History:   Problem Relation Age of Onset    No Known Problems Mother     No Known Problems Brother     Diabetes Maternal Grandmother     Hypertension Maternal Grandmother     Cancer Maternal Grandfather     No Known Problems Brother     No Known Problems Brother        Social History     Social History    Marital status: UNKNOWN     Spouse name: N/A    Number of children: N/A    Years of education: N/A     Occupational History    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 Review of Systems   Constitutional: Positive for fatigue. Negative for appetite change, chills and fever. HENT: Negative. Negative for congestion and sore throat. Eyes: Negative. Negative for visual disturbance. Respiratory: Negative. Negative for cough and shortness of breath. Cardiovascular: Negative. Negative for chest pain, palpitations and leg swelling. Gastrointestinal: Negative. Negative for abdominal pain, constipation, diarrhea, nausea and vomiting. Genitourinary: Negative. Negative for dysuria, flank pain and hematuria. Musculoskeletal: Negative. Negative for back pain and neck pain. Skin: Negative. Negative for rash. Neurological: Positive for headaches (not currently). Negative for dizziness, syncope, weakness and numbness. Hematological: Negative. Psychiatric/Behavioral: Negative. All other systems reviewed and are negative.       Vitals:    03/17/18 1439   BP: 103/63   Pulse: (!) 59   Resp: 16   Temp: 98.2 °F (36.8 °C)   SpO2: 100%   Weight: 45.4 kg (100 lb)   Height: 4' 9\" (1.448 m)            Physical Exam   Constitutional: She is oriented to person, place, and time. She appears well-developed and well-nourished. No distress. HENT:   Head: Normocephalic and atraumatic. Right Ear: External ear normal.   Left Ear: External ear normal.   Nose: Nose normal.   Mouth/Throat: Oropharynx is clear and moist. No oropharyngeal exudate. Eyes: Conjunctivae and EOM are normal. Pupils are equal, round, and reactive to light. Neck: Normal range of motion. Neck supple. No thyroid mass and no thyromegaly present. Cardiovascular: Normal rate, S1 normal, S2 normal, normal heart sounds, intact distal pulses and normal pulses. Exam reveals no gallop and no friction rub. No murmur heard. Pulses:       Dorsalis pedis pulses are 2+ on the right side, and 2+ on the left side. Pulmonary/Chest: Effort normal and breath sounds normal. No accessory muscle usage. No respiratory distress. She has no decreased breath sounds. She has no wheezes. She has no rhonchi. She has no rales. Abdominal: Soft. Normal appearance and bowel sounds are normal. She exhibits no distension. There is no hepatosplenomegaly. There is no tenderness. There is no rebound, no guarding and no CVA tenderness. Musculoskeletal: Normal range of motion. She exhibits no edema or tenderness. FROM spine and all joints/extremities in ER without difficulty. Ambulatory in ER without difficulty. Lymphadenopathy:     She has no cervical adenopathy. Neurological: She is alert and oriented to person, place, and time. She has normal strength. No sensory deficit. Coordination normal.   No focal neurologic deficit. Strength 5/5 and equal bilateral upper and lower extremities. NVI all extremities, brisk cap refill all extremities. Skin: Skin is warm and dry. No rash noted. She is not diaphoretic. No erythema. No pallor. Psychiatric: She has a normal mood and affect. Her behavior is normal.   Nursing note and vitals reviewed.        MDM  Number of Diagnoses or Management Options  Diagnosis management comments: DDx: hyperthyroid, electrolyte abnormality, UTI, migraines       Amount and/or Complexity of Data Reviewed  Clinical lab tests: reviewed and ordered  Discuss the patient with other providers: yes (Dr. Valentina Porras)    Patient Progress  Patient progress: stable        ED Course       Procedures                       2:56 PM   [place] Burnham PA-C discussed patient with Endy Mccoy MD who is in agreement with care plan as outlined. Will be in to see the patient. No further recommendations. [place] Burnham PA-C      CONSULT NOTE:   4:02 PM  Florida Burnham PA-C  spoke with Dr. Maya Gonzales,   Specialty: family practice  Discussed pt's hx, disposition, and available diagnostic and imaging results. Reviewed care plans. Consultant agrees with plans as outlined. Will come review chartt. [place] Burnham PA-C    4:05 PM  Dr. Maya Gonzales recommends stopping propranolol completely at this time and reducing methimazole to every other day. Follow up with primary care this week. [place] Burnham PA-C     4:06 PM  Pt has been reevaluated. There are no new complaints, changes, or physical findings at this time. Medications have been reviewed w/ pt and/or family. Pt and/or family's questions have been answered. Pt and/or family expressed good understanding of the dx/tx/rx and is in agreement with plan of care. Pt instructed and agreed to f/u w/ PCP and to return to ED upon further deterioration. Pt is ready for discharge.     LABORATORY TESTS:  Recent Results (from the past 12 hour(s))   CBC WITH AUTOMATED DIFF    Collection Time: 03/17/18  3:04 PM   Result Value Ref Range    WBC 5.7 3.6 - 11.0 K/uL    RBC 4.15 3.80 - 5.20 M/uL    HGB 11.9 11.5 - 16.0 g/dL    HCT 36.8 35.0 - 47.0 %    MCV 88.7 80.0 - 99.0 FL    MCH 28.7 26.0 - 34.0 PG    MCHC 32.3 30.0 - 36.5 g/dL    RDW 12.5 11.5 - 14.5 %    PLATELET 473 679 - 064 K/uL    MPV 10.1 8.9 - 12.9 FL    NRBC 0.0 0  WBC    ABSOLUTE NRBC 0.00 0.00 - 0.01 K/uL NEUTROPHILS 61 32 - 75 %    LYMPHOCYTES 27 12 - 49 %    MONOCYTES 10 5 - 13 %    EOSINOPHILS 2 0 - 7 %    BASOPHILS 0 0 - 1 %    IMMATURE GRANULOCYTES 0 0.0 - 0.5 %    ABS. NEUTROPHILS 3.4 1.8 - 8.0 K/UL    ABS. LYMPHOCYTES 1.5 0.8 - 3.5 K/UL    ABS. MONOCYTES 0.6 0.0 - 1.0 K/UL    ABS. EOSINOPHILS 0.1 0.0 - 0.4 K/UL    ABS. BASOPHILS 0.0 0.0 - 0.1 K/UL    ABS. IMM. GRANS. 0.0 0.00 - 0.04 K/UL    DF AUTOMATED     METABOLIC PANEL, BASIC    Collection Time: 03/17/18  3:04 PM   Result Value Ref Range    Sodium 140 136 - 145 mmol/L    Potassium 4.0 3.5 - 5.1 mmol/L    Chloride 107 97 - 108 mmol/L    CO2 30 21 - 32 mmol/L    Anion gap 3 (L) 5 - 15 mmol/L    Glucose 97 65 - 100 mg/dL    BUN 16 6 - 20 MG/DL    Creatinine 0.74 0.55 - 1.02 MG/DL    BUN/Creatinine ratio 22 (H) 12 - 20      GFR est AA >60 >60 ml/min/1.73m2    GFR est non-AA >60 >60 ml/min/1.73m2    Calcium 8.3 (L) 8.5 - 10.1 MG/DL   TSH 3RD GENERATION    Collection Time: 03/17/18  3:04 PM   Result Value Ref Range    TSH 7.42 (H) 0.36 - 3.74 uIU/mL   URINALYSIS W/ RFLX MICROSCOPIC    Collection Time: 03/17/18  3:04 PM   Result Value Ref Range    Color YELLOW/STRAW      Appearance CLOUDY (A) CLEAR      Specific gravity 1.023 1.003 - 1.030      pH (UA) 6.5 5.0 - 8.0      Protein NEGATIVE  NEG mg/dL    Glucose NEGATIVE  NEG mg/dL    Ketone NEGATIVE  NEG mg/dL    Bilirubin NEGATIVE  NEG      Blood NEGATIVE  NEG      Urobilinogen 0.2 0.2 - 1.0 EU/dL    Nitrites NEGATIVE  NEG      Leukocyte Esterase NEGATIVE  NEG     HCG URINE, QL. - POC    Collection Time: 03/17/18  3:06 PM   Result Value Ref Range    Pregnancy test,urine (POC) NEGATIVE  NEG         IMAGING RESULTS:  No orders to display     No results found. MEDICATIONS GIVEN:  Medications   sodium chloride 0.9 % bolus infusion 1,000 mL (1,000 mL IntraVENous Refused 3/17/18 7006)       IMPRESSION:  1. Hypothyroidism due to medication        PLAN:  1.    Current Discharge Medication List      CONTINUE these medications which have NOT CHANGED    Details   methIMAzole (TAPAZOLE) 10 mg tablet Take 1 Tab by mouth daily. Qty: 90 Tab, Refills: 2    Associated Diagnoses: Graves disease      norgestimate-ethinyl estradiol (ORTHO-CYCLEN, SPRINTEC) 0.25-35 mg-mcg tab Take 1 Tab by mouth daily.   Qty: 1 Package, Refills: 11    Associated Diagnoses: Irregular periods/menstrual cycles         STOP taking these medications       propranolol (INDERAL) 10 mg tablet Comments:   Reason for Stoppin.   Follow-up Information     Follow up With Details Comments Contact Info    Jonnathan Sanchez MD Schedule an appointment as soon as possible for a visit in 3 days  [facility]  [phone]      [facility] EM[facility]Y [facility]  If symptoms worsen [address] [facility]  [phone]            Return to ED if worse

## 2018-03-17 NOTE — DISCHARGE INSTRUCTIONS

## 2018-03-23 ENCOUNTER — OFFICE VISIT (OUTPATIENT)
Dept: FAMILY MEDICINE CLINIC | Age: 23
End: 2018-03-23

## 2018-03-23 VITALS
HEIGHT: 57 IN | OXYGEN SATURATION: 99 % | WEIGHT: 104 LBS | RESPIRATION RATE: 16 BRPM | TEMPERATURE: 96.5 F | BODY MASS INDEX: 22.44 KG/M2 | SYSTOLIC BLOOD PRESSURE: 112 MMHG | DIASTOLIC BLOOD PRESSURE: 67 MMHG | HEART RATE: 88 BPM

## 2018-03-23 DIAGNOSIS — Z00.00 WELL WOMAN EXAM (NO GYNECOLOGICAL EXAM): Primary | ICD-10-CM

## 2018-03-23 DIAGNOSIS — E05.00 GRAVES DISEASE: ICD-10-CM

## 2018-03-23 DIAGNOSIS — J02.0 STREP PHARYNGITIS: ICD-10-CM

## 2018-03-23 DIAGNOSIS — J02.9 SORE THROAT: ICD-10-CM

## 2018-03-23 LAB — S PYO AG THROAT QL: POSITIVE

## 2018-03-23 RX ORDER — PROPRANOLOL HYDROCHLORIDE 10 MG/1
TABLET ORAL
Refills: 2 | COMMUNITY
Start: 2017-12-22 | End: 2018-03-23 | Stop reason: ALTCHOICE

## 2018-03-23 RX ORDER — PENICILLIN V POTASSIUM 500 MG/1
500 TABLET, FILM COATED ORAL 2 TIMES DAILY
Qty: 20 TAB | Refills: 0 | Status: SHIPPED | OUTPATIENT
Start: 2018-03-23 | End: 2018-04-02

## 2018-03-23 NOTE — MR AVS SNAPSHOT
[address] 
[phone] Patient: Shiva Dent MRN: [id number] Visit Information Date & Time Provider Department Dept. Phone Encounter #  
 3/23/2018  2:10 PM Jorene Riedel, MD [address] [facility] [phone] [id number] Your Appointments 7/3/2018  2:30 PM  
ROUTINE CARE with Daija Joshua MD  
[facility] [address] [place] [address]) Appt Note: f/u after hospital offered sooner appt for 3/19/18 pt refused. Mireille Coffman lws  
 [address] [facility] [address]  
[phone]  
  
   
 [address] [place] [id number] Upcoming Health Maintenance Date Due Hepatitis A Peds Age 1-18 (1 of 2 - Standard Series) 5/3/1998 DTaP/Tdap/Td series (1 - Tdap) 5/3/2004 HPV AGE 9Y-26Y (1 of 3 - Female 3 Dose Series) 5/3/2008 Influenza Age 5 to Adult 8/1/2017 Allergies as of 3/23/2018  Review Complete On: 3/23/2018 By: Essie Cohn LPN No Known Allergies Current Immunizations  Never Reviewed No immunizations on file. Not reviewed this visit You Were Diagnosed With   
  
 Codes Comments Sore throat    -  Primary ICD-10-CM: J02.9 ICD-9-CM: 997 Strep pharyngitis     ICD-10-CM: J02.0 ICD-9-CM: 034.0 Well woman exam (no gynecological exam)     ICD-10-CM: Z00.00 ICD-9-CM: V70.0 Hyperthyroidism     ICD-10-CM: E05.90 ICD-9-CM: 242.90 Vitals BP Pulse Temp Resp Height(growth percentile) Weight(growth percentile) 112/67 88 96.5 °F (35.8 °C) (Oral) 16 4' 9\" (1.448 m) 104 lb (47.2 kg) LMP SpO2 BMI OB Status Smoking Status 03/21/2018 99% 22.51 kg/m2 Having regular periods Never Smoker Vitals History BMI and BSA Data Body Mass Index Body Surface Area  
 22.51 kg/m 2 1.38 m 2 Preferred Pharmacy Pharmacy Name Phone [address] Iman Alvarez [phone] Your Updated Medication List  
  
   
This list is accurate as of 3/23/18  3:06 PM.  Always use your most recent med list.  
  
  
  
  
 methIMAzole 10 mg tablet Commonly known as:  TAPAZOLE Take 1 Tab by mouth daily. norgestimate-ethinyl estradiol 0.25-35 mg-mcg Tab Commonly known as:  Kennedy Guitar Take 1 Tab by mouth daily. penicillin v potassium 500 mg tablet Commonly known as:  VEETID Take 1 Tab by mouth two (2) times a day for 10 days. Prescriptions Sent to Pharmacy Refills  
 penicillin v potassium (VEETID) 500 mg tablet 0 Sig: Take 1 Tab by mouth two (2) times a day for 10 days. Class: Normal  
 Pharmacy: [facility] [address] [place] [address] Iman Alvarez Ph #: [phone] Route: Oral  
  
We Performed the Following AMB POC RAPID STREP TEST [62515 CPT(R)] Patient Instructions Well Visit, Ages 25 to 48: Care Instructions Your Care Instructions Physical exams can help you stay healthy. Your doctor has checked your overall health and may have suggested ways to take good care of yourself. He or she also may have recommended tests. At home, you can help prevent illness with healthy eating, regular exercise, and other steps. Follow-up care is a key part of your treatment and safety. Be sure to make and go to all appointments, and call your doctor if you are having problems. It's also a good idea to know your test results and keep a list of the medicines you take. How can you care for yourself at home? · Reach and stay at a healthy weight. This will lower your risk for many problems, such as obesity, diabetes, heart disease, and high blood pressure. · Get at least 30 minutes of physical activity on most days of the week. Walking is a good choice.  You also may want to do other activities, such as running, swimming, cycling, or playing tennis or team sports. Discuss any changes in your exercise program with your doctor. · Do not smoke or allow others to smoke around you. If you need help quitting, talk to your doctor about stop-smoking programs and medicines. These can increase your chances of quitting for good. · Talk to your doctor about whether you have any risk factors for sexually transmitted infections (STIs). Having one sex partner (who does not have STIs and does not have sex with anyone else) is a good way to avoid these infections. · Use birth control if you do not want to have children at this time. Talk with your doctor about the choices available and what might be best for you. · Protect your skin from too much sun. When you're outdoors from 10 a.m. to 4 p.m., stay in the shade or cover up with clothing and a hat with a wide brim. Wear sunglasses that block UV rays. Even when it's cloudy, put broad-spectrum sunscreen (SPF 30 or higher) on any exposed skin. · See a dentist one or two times a year for checkups and to have your teeth cleaned. · Wear a seat belt in the car. · Drink alcohol in moderation, if at all. That means no more than 2 drinks a day for men and 1 drink a day for women. Follow your doctor's advice about when to have certain tests. These tests can spot problems early. For everyone · Cholesterol. Have the fat (cholesterol) in your blood tested after age 21. Your doctor will tell you how often to have this done based on your age, family history, or other things that can increase your risk for heart disease. · Blood pressure. Have your blood pressure checked during a routine doctor visit. Your doctor will tell you how often to check your blood pressure based on your age, your blood pressure results, and other factors. · Vision. Talk with your doctor about how often to have a glaucoma test. 
· Diabetes. Ask your doctor whether you should have tests for diabetes. · Colon cancer. Have a test for colon cancer at age 48. You may have one of several tests. If you are younger than 48, you may need a test earlier if you have any risk factors. Risk factors include whether you already had a precancerous polyp removed from your colon or whether your parent, brother, sister, or child has had colon cancer. For women · Breast exam and mammogram. Talk to your doctor about when you should have a clinical breast exam and a mammogram. Medical experts differ on whether and how often women under 50 should have these tests. Your doctor can help you decide what is right for you. · Pap test and pelvic exam. Begin Pap tests at age 24. A Pap test is the best way to find cervical cancer. The test often is part of a pelvic exam. Ask how often to have this test. 
· Tests for sexually transmitted infections (STIs). Ask whether you should have tests for STIs. You may be at risk if you have sex with more than one person, especially if your partners do not wear condoms. For men · Tests for sexually transmitted infections (STIs). Ask whether you should have tests for STIs. You may be at risk if you have sex with more than one person, especially if you do not wear a condom. · Testicular cancer exam. Ask your doctor whether you should check your testicles regularly. · Prostate exam. Talk to your doctor about whether you should have a blood test (called a PSA test) for prostate cancer. Experts differ on whether and when men should have this test. Some experts suggest it if you are older than 39 and are -American or have a father or brother who got prostate cancer when he was younger than 72. When should you call for help? Watch closely for changes in your health, and be sure to contact your doctor if you have any problems or symptoms that concern you. Where can you learn more? Go to http://valerie-charissa.info/. Enter P072 in the search box to learn more about \"Well Visit, Ages 25 to 48: Care Instructions. \" Current as of: May 12, 2017 Content Version: 11.4 © 9844-0893 [employer]. Care instructions adapted under license by [employer] (which disclaims liability or warranty for this information). If you have questions about a medical condition or this instruction, always ask your healthcare professional. [place] [address] any warranty or liability for your use of this information. Strep Throat: Care Instructions Your Care Instructions Strep throat is a bacterial infection that causes sudden, severe sore throat and fever. Strep throat, which is caused by bacteria called streptococcus, is treated with antibiotics. Sometimes a strep test is necessary to tell if the sore throat is caused by strep bacteria. Treatment can help ease symptoms and may prevent future problems. Follow-up care is a key part of your treatment and safety. Be sure to make and go to all appointments, and call your doctor if you are having problems. It's also a good idea to know your test results and keep a list of the medicines you take. How can you care for yourself at home? · Take your antibiotics as directed. Do not stop taking them just because you feel better. You need to take the full course of antibiotics. · Strep throat can spread to others until 24 hours after you begin taking antibiotics. During this time, you should avoid contact with other people at work or home, especially infants and children. Do not sneeze or cough on others, and wash your hands often. Keep your drinking glass and eating utensils separate from those of others, and wash these items well in hot, soapy water. · Gargle with warm salt water at least once each hour to help reduce swelling and make your throat feel better. Use 1 teaspoon of salt mixed in 8 fluid ounces of warm water. · Take an over-the-counter pain medication, such as acetaminophen (Tylenol), ibuprofen (Advil, Motrin), or naproxen (Aleve). Read and follow all instructions on the label. · Try an over-the-counter anesthetic throat spray or throat lozenges, which may help relieve throat pain. · Drink plenty of fluids. Fluids may help soothe an irritated throat. Hot fluids, such as tea or soup, may help your throat feel better. · Eat soft solids and drink plenty of clear liquids. Flavored ice pops, ice cream, scrambled eggs, sherbet, and gelatin dessert (such as Jell-O) may also soothe the throat. · Get lots of rest. 
· Do not smoke, and avoid secondhand smoke. If you need help quitting, talk to your doctor about stop-smoking programs and medicines. These can increase your chances of quitting for good. · Use a vaporizer or humidifier to add moisture to the air in your bedroom. Follow the directions for cleaning the machine. When should you call for help? Call your doctor now or seek immediate medical care if: 
? · You have a new or higher fever. ? · You have a fever with a stiff neck or severe headache. ? · You have new or worse trouble swallowing. ? · Your sore throat gets much worse on one side. ? · Your pain becomes much worse on one side of your throat. ? Watch closely for changes in your health, and be sure to contact your doctor if: 
? · You are not getting better after 2 days (48 hours). ? · You do not get better as expected. Where can you learn more? Go to http://valerie-charissa.info/. Enter K625 in the search box to learn more about \"Strep Throat: Care Instructions. \" Current as of: May 12, 2017 Content Version: 11.4 © 4515-7278 Healthwise, [employer]. Care instructions adapted under license by [employer] (which disclaims liability or warranty for this information).  If you have questions about a medical condition or this instruction, always ask your healthcare professional. [place] [address] any warranty or liability for your use of this information. Introducing [facility]! Kirstin Gambino introduces Teranode patient portal. Now you can access parts of your medical record, email your doctor's office, and request medication refills online. 1. In your internet browser, go to [email or web address] 2. Click on the First Time User? Click Here link in the Sign In box. You will see the New Member Sign Up page. 3. Enter your Teranode Access Code exactly as it appears below. You will not need to use this code after youve completed the sign-up process. If you do not sign up before the expiration date, you must request a new code. · Teranode Access Code: [email or web address] Expires: 6/15/2018  3:15 PM 
 
4. Enter the last four digits of your Social Security Number (xxxx) and Date of Birth (mm/dd/yyyy) as indicated and click Submit. You will be taken to the next sign-up page. 5. Create a Teranode ID. This will be your Teranode login ID and cannot be changed, so think of one that is secure and easy to remember. 6. Create a Teranode password. You can change your password at any time. 7. Enter your Password Reset Question and Answer. This can be used at a later time if you forget your password. 8. Enter your e-mail address. You will receive e-mail notification when new information is available in [address]. 9. Click Sign Up. You can now view and download portions of your medical record. 10. Click the Download Summary menu link to download a portable copy of your medical information. If you have questions, please visit the Frequently Asked Questions section of the Teranode website. Remember, Teranode is NOT to be used for urgent needs. For medical emergencies, dial 911. Now available from your iPhone and Android! Please provide this summary of care documentation to your next provider. Your primary care clinician is listed as Ruben Cedeño. If you have any questions after today's visit, please call [phone].

## 2018-03-23 NOTE — LETTER
NOTIFICATION RETURN TO WORK 
 
3/23/2018 3:02 PM 
 
Ms. Benita Bocanegra [address] [place] [id number] To Whom It May Concern: 
 
Benita Bocanegra is currently under the care of [facility]. She will return to work on: 3/26/18. If there are questions or concerns please have the patient contact our office. Sincerely, Digna Leger MD

## 2018-03-23 NOTE — PROGRESS NOTES
[address] [place] [address] with [address] [place] [address]     Chief Complaint: Ale Victoria for a check up, and I have a sore throat. \"    Jennifer Yan is an 23 y.o. female who presents for a well woman exam.    Age at which menses began: 17  Last menstrual period was: current  Length of periods: Varies (recently started birth control)  Number of days between periods: Varies (recently started birth control). Menstrual flow: Painful the first day. G0    Sexually active?: Yes  Number of sexual partners: 1  Type of sexual partners: male  Method of family planning: OCPs, condoms. Diet: Pretty good. Eats some chips and cookies. Mostly fruits and vegetables. Exercise: Works at [employer]. Exercies there. Complaining of sore throat today. Has been present for 3-4 days. Multiple sick exposures (works at [employer] at [employer]). Multiple children with strep pharyngitis. No cough. No fevers/chills. Patient did have a strep pharyngitis infection in January 2018. Allergies - reviewed:   No Known Allergies    Medications - reviewed:   Current Outpatient Prescriptions   Medication Sig    penicillin v potassium (VEETID) 500 mg tablet Take 1 Tab by mouth two (2) times a day for 10 days.  methIMAzole (TAPAZOLE) 10 mg tablet Take 1 Tab by mouth daily. (Patient taking differently: Take 10 mg by mouth every other day.)    norgestimate-ethinyl estradiol (ORTHO-CYCLEN, SPRINTEC) 0.25-35 mg-mcg tab Take 1 Tab by mouth daily. No current facility-administered medications for this visit.         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 Social History Main Topics    Smoking status: Never Smoker    Smokeless tobacco: Never Used    Alcohol use No    Drug use: No    Sexual activity: Not Currently     Partners: Male      Comment: Previously Sexually active,      Other Topics Concern    Not on file     Social History Narrative         Family History - reviewed:  Family History   Problem Relation Age of Onset    No Known Problems Mother     No Known Problems Brother     Diabetes Maternal Grandmother     Hypertension Maternal Grandmother     Cancer Maternal Grandfather     No Known Problems Brother     No Known Problems Brother          Immunizations - reviewed: There is no immunization history on file for this patient. Does not know immunization history. Will obtain this information and drop it off at clinic so that we can administer appropriate catch up vaccinations. Health Maintenance - reviewed:  No health maintenance items due at this time. Review of Systems  Review of Systems   Constitutional: Negative for chills and fever. HENT: Positive for sore throat. Respiratory: Negative for cough and shortness of breath. Cardiovascular: Negative for chest pain. Gastrointestinal: Negative for abdominal pain, nausea and vomiting. Genitourinary: Negative for menstrual problem. Physical Exam    Visit Vitals    /67    Pulse 88    Temp 96.5 °F (35.8 °C) (Oral)    Resp 16    Ht 4' 9\" (1.448 m)    Wt 104 lb (47.2 kg)    SpO2 99%    BMI 22.51 kg/m2       Physical Exam   Constitutional: She is oriented to person, place, and time. She appears well-developed and well-nourished. No distress. HENT:   Head: Normocephalic.    Right Ear: Hearing, tympanic membrane, external ear and ear canal normal.   Left Ear: Hearing, tympanic membrane, external ear and ear canal normal.   Nose: Nose normal.   Mouth/Throat: Mucous membranes are normal. Oropharyngeal exudate and posterior oropharyngeal erythema present. Eyes: Pupils are equal, round, and reactive to light. Right eye exhibits no discharge. Neck: Normal range of motion. No thyromegaly present. Cardiovascular: Normal rate, normal heart sounds and intact distal pulses. Pulmonary/Chest: Effort normal and breath sounds normal. No respiratory distress. She has no wheezes. She has no rales. She exhibits no tenderness. Abdominal: Soft. Bowel sounds are normal. She exhibits no distension and no mass. There is no tenderness. There is no rebound and no guarding. Musculoskeletal: Normal range of motion. She exhibits no edema. Lymphadenopathy:     She has no cervical adenopathy. Neurological: She is alert and oriented to person, place, and time. No cranial nerve deficit. Skin: Skin is warm and dry. She is not diaphoretic. Psychiatric: She has a normal mood and affect. Her behavior is normal. Judgment and thought content normal.   Nursing note and vitals reviewed. Recent Results (from the past 12 hour(s))   AMB POC RAPID STREP TEST    Collection Time: 03/23/18  2:35 PM   Result Value Ref Range    Group A Strep Ag Positive Negative       Assessment    ICD-10-CM ICD-9-CM    1. Well woman exam (no gynecological exam) Z00.00 V70.0    2. Sore throat J02.9 462 AMB POC RAPID STREP TEST   3. Strep pharyngitis J02.0 034.0 penicillin v potassium (VEETID) 500 mg tablet   4. Graves disease E05.00 242.00      Plan  1. Sore throat - strep testing done as patient complaining of sore throat today. - AMB POC RAPID STREP TEST    2. Strep pharyngitis - strep testing positive. Also had positive strep testing in 1/2018 (may be a carrier). Will go ahead and treat with penicillin today. No insurnace--sent to [facility] coupon given. - penicillin v potassium (VEETID) 500 mg tablet;  Take 1 Tab by mouth two (2) times a day for 10 days. Dispense: 20 Tab; Refill: 0    3. Well woman exam (no gynecological exam) - doing well. Counseled on good lifestyle habits. Continue to practice good dietary and exercise habits. 4. Graves disease - followed by Dr. Dee Thomas. On tapazole. No symptoms currently. Doing well. Follow-up Disposition:  Return in about 1 year (around 3/23/2019) for yearly wellness exam..    I have discussed the diagnosis with the patient and the intended plan as seen in the above orders. Patient verbalized understanding of the plan and agrees with the plan. The patient has received an after-visit summary and questions were answered concerning future plans. I have discussed medication side effects and warnings with the patient as well. Informed patient to return to the office if new symptoms arise. Patient was discussed with Dr. Enrique Jones.     Winnie Duncan MD  Family Medicine Resident

## 2018-03-23 NOTE — PATIENT INSTRUCTIONS
Well Visit, Ages 25 to 48: Care Instructions  Your Care Instructions    Physical exams can help you stay healthy. Your doctor has checked your overall health and may have suggested ways to take good care of yourself. He or she also may have recommended tests. At home, you can help prevent illness with healthy eating, regular exercise, and other steps. Follow-up care is a key part of your treatment and safety. Be sure to make and go to all appointments, and call your doctor if you are having problems. It's also a good idea to know your test results and keep a list of the medicines you take. How can you care for yourself at home? · Reach and stay at a healthy weight. This will lower your risk for many problems, such as obesity, diabetes, heart disease, and high blood pressure. · Get at least 30 minutes of physical activity on most days of the week. Walking is a good choice. You also may want to do other activities, such as running, swimming, cycling, or playing tennis or team sports. Discuss any changes in your exercise program with your doctor. · Do not smoke or allow others to smoke around you. If you need help quitting, talk to your doctor about stop-smoking programs and medicines. These can increase your chances of quitting for good. · Talk to your doctor about whether you have any risk factors for sexually transmitted infections (STIs). Having one sex partner (who does not have STIs and does not have sex with anyone else) is a good way to avoid these infections. · Use birth control if you do not want to have children at this time. Talk with your doctor about the choices available and what might be best for you. · Protect your skin from too much sun. When you're outdoors from 10 a.m. to 4 p.m., stay in the shade or cover up with clothing and a hat with a wide brim. Wear sunglasses that block UV rays. Even when it's cloudy, put broad-spectrum sunscreen (SPF 30 or higher) on any exposed skin.   · See a dentist one or two times a year for checkups and to have your teeth cleaned. · Wear a seat belt in the car. · Drink alcohol in moderation, if at all. That means no more than 2 drinks a day for men and 1 drink a day for women. Follow your doctor's advice about when to have certain tests. These tests can spot problems early. For everyone  · Cholesterol. Have the fat (cholesterol) in your blood tested after age 21. Your doctor will tell you how often to have this done based on your age, family history, or other things that can increase your risk for heart disease. · Blood pressure. Have your blood pressure checked during a routine doctor visit. Your doctor will tell you how often to check your blood pressure based on your age, your blood pressure results, and other factors. · Vision. Talk with your doctor about how often to have a glaucoma test.  · Diabetes. Ask your doctor whether you should have tests for diabetes. · Colon cancer. Have a test for colon cancer at age 48. You may have one of several tests. If you are younger than 48, you may need a test earlier if you have any risk factors. Risk factors include whether you already had a precancerous polyp removed from your colon or whether your parent, brother, sister, or child has had colon cancer. For women  · Breast exam and mammogram. Talk to your doctor about when you should have a clinical breast exam and a mammogram. Medical experts differ on whether and how often women under 50 should have these tests. Your doctor can help you decide what is right for you. · Pap test and pelvic exam. Begin Pap tests at age 24. A Pap test is the best way to find cervical cancer. The test often is part of a pelvic exam. Ask how often to have this test.  · Tests for sexually transmitted infections (STIs). Ask whether you should have tests for STIs. You may be at risk if you have sex with more than one person, especially if your partners do not wear condoms.   For men  · Tests for sexually transmitted infections (STIs). Ask whether you should have tests for STIs. You may be at risk if you have sex with more than one person, especially if you do not wear a condom. · Testicular cancer exam. Ask your doctor whether you should check your testicles regularly. · Prostate exam. Talk to your doctor about whether you should have a blood test (called a PSA test) for prostate cancer. Experts differ on whether and when men should have this test. Some experts suggest it if you are older than 39 and are -American or have a father or brother who got prostate cancer when he was younger than 72. When should you call for help? Watch closely for changes in your health, and be sure to contact your doctor if you have any problems or symptoms that concern you. Where can you learn more? Go to http://valerie-charissa.info/. Enter P072 in the search box to learn more about \"Well Visit, Ages 25 to 48: Care Instructions. \"  Current as of: May 12, 2017  Content Version: 11.4  © 3717-2564 [employer]. Care instructions adapted under license by [employer] (which disclaims liability or warranty for this information). If you have questions about a medical condition or this instruction, always ask your healthcare professional. [place] [address] any warranty or liability for your use of this information. Strep Throat: Care Instructions  Your Care Instructions    Strep throat is a bacterial infection that causes sudden, severe sore throat and fever. Strep throat, which is caused by bacteria called streptococcus, is treated with antibiotics. Sometimes a strep test is necessary to tell if the sore throat is caused by strep bacteria. Treatment can help ease symptoms and may prevent future problems. Follow-up care is a key part of your treatment and safety.  Be sure to make and go to all appointments, and call your doctor if you are having problems. It's also a good idea to know your test results and keep a list of the medicines you take. How can you care for yourself at home? · Take your antibiotics as directed. Do not stop taking them just because you feel better. You need to take the full course of antibiotics. · Strep throat can spread to others until 24 hours after you begin taking antibiotics. During this time, you should avoid contact with other people at work or home, especially infants and children. Do not sneeze or cough on others, and wash your hands often. Keep your drinking glass and eating utensils separate from those of others, and wash these items well in hot, soapy water. · Gargle with warm salt water at least once each hour to help reduce swelling and make your throat feel better. Use 1 teaspoon of salt mixed in 8 fluid ounces of warm water. · Take an over-the-counter pain medication, such as acetaminophen (Tylenol), ibuprofen (Advil, Motrin), or naproxen (Aleve). Read and follow all instructions on the label. · Try an over-the-counter anesthetic throat spray or throat lozenges, which may help relieve throat pain. · Drink plenty of fluids. Fluids may help soothe an irritated throat. Hot fluids, such as tea or soup, may help your throat feel better. · Eat soft solids and drink plenty of clear liquids. Flavored ice pops, ice cream, scrambled eggs, sherbet, and gelatin dessert (such as Jell-O) may also soothe the throat. · Get lots of rest.  · Do not smoke, and avoid secondhand smoke. If you need help quitting, talk to your doctor about stop-smoking programs and medicines. These can increase your chances of quitting for good. · Use a vaporizer or humidifier to add moisture to the air in your bedroom. Follow the directions for cleaning the machine. When should you call for help? Call your doctor now or seek immediate medical care if:  ? · You have a new or higher fever.    ? · You have a fever with a stiff neck or severe headache. ? · You have new or worse trouble swallowing. ? · Your sore throat gets much worse on one side. ? · Your pain becomes much worse on one side of your throat. ? Watch closely for changes in your health, and be sure to contact your doctor if:  ? · You are not getting better after 2 days (48 hours). ? · You do not get better as expected. Where can you learn more? Go to http://valerie-charissa.info/. Enter K625 in the search box to learn more about \"Strep Throat: Care Instructions. \"  Current as of: May 12, 2017  Content Version: 11.4  © 8583-7984 [employer]. Care instructions adapted under license by [employer] (which disclaims liability or warranty for this information). If you have questions about a medical condition or this instruction, always ask your healthcare professional. [address] any warranty or liability for your use of this information.

## 2018-04-26 ENCOUNTER — HOSPITAL ENCOUNTER (EMERGENCY)
Age: 23
Discharge: HOME | End: 2018-04-26
Attending: EMERGENCY MEDICINE
Payer: SUBSIDIZED

## 2018-04-26 VITALS
WEIGHT: 104.28 LBS | BODY MASS INDEX: 21.02 KG/M2 | DIASTOLIC BLOOD PRESSURE: 87 MMHG | TEMPERATURE: 97.7 F | SYSTOLIC BLOOD PRESSURE: 112 MMHG | OXYGEN SATURATION: 99 % | HEART RATE: 88 BPM | HEIGHT: 59 IN | RESPIRATION RATE: 16 BRPM

## 2018-04-26 DIAGNOSIS — E03.2 HYPOTHYROIDISM DUE TO MEDICATION: Primary | ICD-10-CM

## 2018-04-26 DIAGNOSIS — R53.83 FATIGUE, UNSPECIFIED TYPE: ICD-10-CM

## 2018-04-26 LAB
ANION GAP SERPL CALC-SCNC: 5 MMOL/L (ref 5–15)
ATRIAL RATE: 69 BPM
BUN SERPL-MCNC: 19 MG/DL (ref 6–20)
BUN/CREAT SERPL: 40 (ref 12–20)
CALCIUM SERPL-MCNC: 8.7 MG/DL (ref 8.5–10.1)
CALCULATED P AXIS, ECG09: 66 DEGREES
CALCULATED R AXIS, ECG10: -2 DEGREES
CALCULATED T AXIS, ECG11: 8 DEGREES
CHLORIDE SERPL-SCNC: 104 MMOL/L (ref 97–108)
CO2 SERPL-SCNC: 26 MMOL/L (ref 21–32)
CREAT SERPL-MCNC: 0.48 MG/DL (ref 0.55–1.02)
DIAGNOSIS, 93000: NORMAL
GLUCOSE SERPL-MCNC: 83 MG/DL (ref 65–100)
HCG UR QL: NEGATIVE
P-R INTERVAL, ECG05: 134 MS
POTASSIUM SERPL-SCNC: ABNORMAL MMOL/L (ref 3.5–5.1)
Q-T INTERVAL, ECG07: 358 MS
QRS DURATION, ECG06: 90 MS
QTC CALCULATION (BEZET), ECG08: 383 MS
SODIUM SERPL-SCNC: 135 MMOL/L (ref 136–145)
TSH SERPL DL<=0.05 MIU/L-ACNC: 5.22 UIU/ML (ref 0.36–3.74)
VENTRICULAR RATE, ECG03: 69 BPM

## 2018-04-26 PROCEDURE — 84443 ASSAY THYROID STIM HORMONE: CPT | Performed by: FAMILY MEDICINE

## 2018-04-26 PROCEDURE — 93005 ELECTROCARDIOGRAM TRACING: CPT

## 2018-04-26 PROCEDURE — 36415 COLL VENOUS BLD VENIPUNCTURE: CPT | Performed by: FAMILY MEDICINE

## 2018-04-26 PROCEDURE — 81025 URINE PREGNANCY TEST: CPT

## 2018-04-26 PROCEDURE — 96360 HYDRATION IV INFUSION INIT: CPT

## 2018-04-26 PROCEDURE — 99284 EMERGENCY DEPT VISIT MOD MDM: CPT

## 2018-04-26 PROCEDURE — 74011250636 HC RX REV CODE- 250/636: Performed by: FAMILY MEDICINE

## 2018-04-26 PROCEDURE — 80048 BASIC METABOLIC PNL TOTAL CA: CPT | Performed by: FAMILY MEDICINE

## 2018-04-26 RX ADMIN — SODIUM CHLORIDE 1000 ML: 900 INJECTION, SOLUTION INTRAVENOUS at 10:51

## 2018-04-26 NOTE — LETTER
Chaitanya Duff 
[facility] EMERGENCY DEPT 
[address] [id number] 
[phone] Work/School Note Date: 4/26/2018 To Whom It May concern: 
 
Nadia Finch was seen and treated today in the emergency room by the following provider(s): 
Attending Provider: Kaur Garduno MD 
Resident: Charissa Curran DO. Please excuse from work/school 4/26/2018 Sincerely, Phyllis Duffy, ALINA

## 2018-04-26 NOTE — DISCHARGE INSTRUCTIONS

## 2018-04-26 NOTE — ED NOTES
Patient discharged by provider. D/C instructions given. Pt educated to take r medications as instructed for management at home. Pt verbalized understanding, verbalized no questions. PIV removed, pressure dressing applied. Pt ambulated out of ER without difficulty, NAD.   Patient Vitals for the past 4 hrs:   Temp Pulse Resp BP SpO2   04/26/18 0959 97.7 °F (36.5 °C) 88 16 112/87 99 %

## 2018-04-26 NOTE — ED PROVIDER NOTES
HPI   22 yo female with hx of Graves disease who presents with 4 days of nausea, fatigue, decreased sleep, and headache. She says symptoms have been intermittent for the past few months but seem to be worse today. Also reports \"funny\" heartbeat yesterday but none today. Has not been eating and drinking very much due to decreased appetite. Was lightheaded this morning but was able to tolerate working out for 45 minutes. She was on daily Propranolol and Methimazole until a month ago. She was seen here with similar sx's and found to have elevated TSH, says she was told to stop Propranolol and to take Methimazole every other day which she has been complaint with. Has not been able to follow up with endocrinologist as advised. Past Medical History:   Diagnosis Date    Graves disease     Thyroid disease        Past Surgical History:   Procedure Laterality Date    HX WISDOM TEETH EXTRACTION           Family History:   Problem Relation Age of Onset    No Known Problems Mother     No Known Problems Brother     Diabetes Maternal Grandmother     Hypertension Maternal Grandmother     Cancer Maternal Grandfather     No Known Problems Brother     No Known Problems Brother        Social History     Social History    Marital status: SINGLE     Spouse name: N/A    Number of children: N/A    Years of education: N/A     Occupational History    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 Social History Main Topics    Smoking status: Never Smoker    Smokeless tobacco: Never Used    Alcohol use No    Drug use: No    Sexual activity: Not Currently     Partners: Male      Comment: Previously Sexually active,      Other Topics Concern    Not on file     Social History Narrative         ALLERGIES: Review of patient's allergies indicates no known allergies. Review of Systems   Constitutional: Positive for activity change, appetite change and fatigue. Negative for chills and fever.    Respiratory: Negative for cough and shortness of breath. Cardiovascular: Positive for palpitations. Negative for chest pain and leg swelling. Gastrointestinal: Positive for constipation, diarrhea and nausea. Negative for vomiting. Neurological: Positive for light-headedness and headaches. Negative for syncope. Vitals:    04/26/18 0959   BP: 112/87   Pulse: 88   Resp: 16   Temp: 97.7 °F (36.5 °C)   SpO2: 99%   Weight: 47.3 kg (104 lb 4.4 oz)   Height: 4' 11\" (1.499 m)            Physical Exam   Constitutional: She is oriented to person, place, and time. She appears well-developed and well-nourished. No distress. HENT:   Head: Normocephalic and atraumatic. Mouth/Throat: Oropharynx is clear and moist. No oropharyngeal exudate. Eyes: Conjunctivae and EOM are normal. Pupils are equal, round, and reactive to light. Neck: Normal range of motion. Neck supple. No thyromegaly present. Cardiovascular: Normal rate, regular rhythm, normal heart sounds and intact distal pulses. Pulmonary/Chest: Effort normal and breath sounds normal. No respiratory distress. Abdominal: Soft. Bowel sounds are normal.   Musculoskeletal: Normal range of motion. She exhibits no edema. Neurological: She is alert and oriented to person, place, and time. Skin: Skin is warm and dry. Psychiatric: She has a normal mood and affect. [facility]      ED Course   22 yo female with hx of Graves disease who presents with persistent nausea, fatigue, decreased appetite, and decreased sleep for the pat 4 days. 11:55 AM  --feeling better with IVF. Labs reviewed, TSH slightly elevated otherwise unremarkable. Advised to hold Methimazole for now and f/u with Endocrine and PCP.      Nonah Severance, DO  Family Medicine Resident, PGY3    Assessment and Plan discussed with Dr Fajardo Friday    Procedures

## 2018-04-26 NOTE — ED TRIAGE NOTES
Pt states she has Grave's disease. C/o recent nausea, heart racing, lightheadedness, loss of appetite, stomach cramping, hormones all over the place per pt. States she doesn't feel like her medication is working.